# Patient Record
Sex: FEMALE
[De-identification: names, ages, dates, MRNs, and addresses within clinical notes are randomized per-mention and may not be internally consistent; named-entity substitution may affect disease eponyms.]

---

## 2017-07-22 ENCOUNTER — HEALTH MAINTENANCE LETTER (OUTPATIENT)
Age: 62
End: 2017-07-22

## 2017-08-24 ENCOUNTER — OFFICE VISIT (OUTPATIENT)
Dept: AUDIOLOGY | Facility: CLINIC | Age: 62
End: 2017-08-24

## 2017-08-24 DIAGNOSIS — H90.A31 MIXED CONDUCTIVE AND SENSORINEURAL HEARING LOSS OF RIGHT EAR WITH RESTRICTED HEARING OF LEFT EAR: ICD-10-CM

## 2017-08-24 DIAGNOSIS — H90.A22 SENSORINEURAL HEARING LOSS (SNHL) OF LEFT EAR WITH RESTRICTED HEARING OF RIGHT EAR: ICD-10-CM

## 2017-08-24 NOTE — PROGRESS NOTES
AUDIOLOGY REPORT    SUBJECTIVE:  Rocio Forbes is a 62 year old female who was seen in Audiology at the [facility] [place] for a cochlear implant evaluation.  The patient has been seen at an outside clinic for a hearing test on 1/23/15 and results indicated mild sensorineural hearing loss in the left ear and a severe rising to moderately-severe mixed hearing loss in the right ear.  The patient reports hearing loss in the right ear present since age 13 and 59 years old in the left ear. She reports a history of multiple right middle ear surgeries and right tinnitus. She utilizes binaural amplification. She denies dizziness.    OBJECTIVE:    Otoscopic exam indicates ears are clear of cerumen bilaterally     Pure Tone Thresholds assessed using conventional audiometry with good  reliability from 250-8000 Hz bilaterally using insert earphones and circumaural headphones     RIGHT:  Severe rising to moderately-severe mixed hearing loss    LEFT:    Mild sensorineural hearing loss    Tympanogram:    RIGHT: negative pressure     LEFT:   hypermobile    Reflexes (reported by stimulus ear):  RIGHT: Ipsilateral is absent at frequencies tested  RIGHT: Contralateral is absent at frequencies tested  LEFT:   Ipsilateral is present at normal levels  LEFT:   Contralateral is absent at frequencies tested      Speech Reception Threshold:    RIGHT: 70 dB HL    LEFT:   30 dB HL  Word Recognition Score:     RIGHT: 84% at 100 dB HL using NU-6 recorded word list.    LEFT:   88% at 70 dB HL using NU-6 recorded word list.    Due to results of the audiogram the cochlear implant evaluation was not completed. We reviewed the results and I explained that she would likely not be a candidate for a cochlear implant but would be a good candidate for a bone-anchored hearing aid. We reviewed how Baha's are different than hearing aids and cochlear implants and she was provided a demo with both manufacturers. She is  interested in moving forward with this option and will check with her insurance in the mean time.    ASSESSMENT:     Compared to patient's previous audiogram dated 1/23/15, hearing has remained stable. Cochlear implant evaluation not completed. Today s results were discussed with the patient in detail.     PLAN:  Patient was counseled regarding bone-anchored hearing aids. It is recommended that the patient return for a device selection and for surgical consultation with Dr. Rasheed.  Please call this clinic with questions regarding these results or recommendations.    Kellen Alvarez  Licensed Audiologist  [place] License #[id number]

## 2017-08-24 NOTE — MR AVS SNAPSHOT
After Visit Summary   8/24/2017    Rocio Forbes    MRN: [id number]           Patient Information     Date Of Birth          1955        Visit Information        Provider Department      8/24/2017 1:00 PM Char Yao AuD M [facility] Audiology        Today's Diagnoses     Mixed conductive and sensorineural hearing loss of right ear with restricted hearing of left ear        Sensorineural hearing loss (SNHL) of left ear with restricted hearing of right ear           Follow-ups after your visit        Your next 10 appointments already scheduled     Oct 05, 2017 11:30 AM CDT   (Arrive by 11:15 AM)   New Patient Visit with MD MARY LOU Drew [facility] Ear Nose and Throat ([facility])    [address] [place] [id number] [phone]            Oct 05, 2017  1:00 PM CDT   Bone [place] Consultation with Natali Trinidad [facility] Audiology ([facility])    [address] [place] [id number] [phone]              Who to contact     Please call your clinic at [phone] to:    Ask questions about your health    Make or cancel appointments    Discuss your medicines    Learn about your test results    Speak to your doctor   If you have compliments or concerns about an experience at your clinic, or if you wish to file a complaint, please contact [facility] Patient Relations at [phone] or email us at [email or web address]         Additional Information About Your Visit        daysofthart Information     [employer] gives you secure access to your electronic health record. If you see a primary care provider, you can also send messages to your care team and make appointments. If you have questions, please call your primary care clinic.  If you do not have a primary care provider, please call [phone] and they will assist you.      [employer] is an electronic gateway  that provides easy, online access to your medical records. With [employer], you can request a clinic appointment, read your test results, renew a prescription or communicate with your care team.     To access your existing account, please contact your [facility] or call [phone] for assistance.        Care EveryWhere ID     This is your Care EveryWhere ID. This could be used by other organizations to access your [place] medical records  [id number]         Blood Pressure from Last 3 Encounters:   No data found for BP    Weight from Last 3 Encounters:   No data found for Wt              We Performed the Following     AUDIOGRAM/TYMPANOGRAM - INTERFACE     Cmpn Audiometry Thrshld Eval & Speech Recog (13679)     Tymps / Reflex   (01718)        Primary Care Provider    None Frw       No address on file        Equal Access to Services     CHUCK SOL : Hadii aad ku hadasho Soomaali, waaxda luqadaha, qaybta kaalmada adeegyada, waxay idiin hayaan dexter raymond . So [facility] [phone].    ATENCIÓN: Si habla español, tiene a reynoso disposición servicios gratuitos de asistencia lingüística. Llame al [phone].    We comply with applicable federal civil rights laws and [place] laws. We do not discriminate on the basis of race, color, national origin, age, disability sex, sexual orientation or gender identity.            Thank you!     Thank you for choosing [facility] AUDIOLOGY  for your care. Our goal is always to provide you with excellent care. Hearing back from our patients is one way we can continue to improve our services. Please take a few minutes to complete the written survey that you may receive in the mail after your visit with us. Thank you!             Your Updated Medication List - Protect others around you: Learn how to safely use, store and throw away your medicines at www.disposemymeds.org.      Notice  As of 8/24/2017  3:07 PM    You have not been prescribed any  medications.

## 2017-11-03 NOTE — TELEPHONE ENCOUNTER
APPT INFO    Date /Time: 11/13/17 AT 8:30 AM   Reason for Appt: BAHA   Ref Provider/Clinic: [facility] AUDIOLOGY   Are there internal records? If yes, list: YES;  [facility] AUDIOLOGY   Patient Contact (Y/N) & Call Details: YES   Action: [place] ,MEDICAL RECORDS REQUESTED     OUTSIDE RECORDS CHECKLIST     CLINIC NAME COMMENTS REC (x) IMG (x)   [facility]  2015 SCANNED IN  X NONE   [facility] NONE

## 2017-11-08 NOTE — TELEPHONE ENCOUNTER
Phone Call:    Who did you talk to? (or) Who did you call? Madeleine ([facility])   Call Detail/Action: Called to check on records, says they received it yesterday but was blurry. Re-faxed request to [facility].

## 2017-11-09 NOTE — TELEPHONE ENCOUNTER
Phone Call:    Who did you talk to? (or) Who did you call? [facility] will only release 1 year of records. Called pt.   Call Detail/Action: LVM to call me back

## 2017-11-09 NOTE — TELEPHONE ENCOUNTER
Records Received From:  [facility]    DATE/EXAM/LOCATION  (specify location if different)   Office Notes: 11/28/07, 9/24/07 11/9/07 Audiology   Labs: 9/24/07   Operative Notes: 11/27/07 Right tympanotomy w/ ossicular reconstruction    Missing: Hx, Right Mastoidectomy (unsure when/where) waiting for pt to call me back   '

## 2017-11-09 NOTE — TELEPHONE ENCOUNTER
Phone Call:    Who did you talk to? (or) Who did you call? Patient called & left vm,    Call Detail/Action: Per pt, [facility]ide[facility] was [facility] in [facility] at [facility] . Pt states that she's tried to get a copy of this op note before in the past and [facility] no longer has it (has been purged). Only other surgery was with Dr. Flor at [facility] which was already received. Pt states that the records with [facility] is probably all that we'll get.

## 2017-11-13 ENCOUNTER — OFFICE VISIT (OUTPATIENT)
Dept: AUDIOLOGY | Facility: CLINIC | Age: 62
End: 2017-11-13

## 2017-11-13 ENCOUNTER — OFFICE VISIT (OUTPATIENT)
Dept: OTOLARYNGOLOGY | Facility: CLINIC | Age: 62
End: 2017-11-13

## 2017-11-13 ENCOUNTER — PRE VISIT (OUTPATIENT)
Dept: OTOLARYNGOLOGY | Facility: CLINIC | Age: 62
End: 2017-11-13

## 2017-11-13 VITALS — WEIGHT: 155 LBS | BODY MASS INDEX: 32.54 KG/M2 | HEIGHT: 58 IN

## 2017-11-13 DIAGNOSIS — H91.90 HEARING LOSS: Primary | ICD-10-CM

## 2017-11-13 DIAGNOSIS — H90.A22 SENSORINEURAL HEARING LOSS (SNHL) OF LEFT EAR WITH RESTRICTED HEARING OF RIGHT EAR: ICD-10-CM

## 2017-11-13 DIAGNOSIS — H92.11 OTORRHEA, RIGHT: Primary | ICD-10-CM

## 2017-11-13 DIAGNOSIS — H90.A31 MIXED CONDUCTIVE AND SENSORINEURAL HEARING LOSS OF RIGHT EAR WITH RESTRICTED HEARING OF LEFT EAR: Primary | ICD-10-CM

## 2017-11-13 PROBLEM — H90.A11 CONDUCTIVE HEARING LOSS OF RIGHT EAR WITH RESTRICTED HEARING OF LEFT EAR: Status: ACTIVE | Noted: 2017-10-25

## 2017-11-13 RX ORDER — CITALOPRAM HYDROBROMIDE 10 MG/1
20 TABLET ORAL DAILY
COMMUNITY
Start: 2017-10-25 | End: 2020-03-03

## 2017-11-13 RX ORDER — OFLOXACIN 3 MG/ML
5 SOLUTION AURICULAR (OTIC) 2 TIMES DAILY
Qty: 5 ML | Refills: 0 | Status: SHIPPED | OUTPATIENT
Start: 2017-11-13 | End: 2017-11-23

## 2017-11-13 ASSESSMENT — PAIN SCALES - GENERAL: PAINLEVEL: NO PAIN (0)

## 2017-11-13 NOTE — LETTER
11/13/2017       RE: Rocio Forbes  [address] [address] [address]     Dear Colleague,    Thank you for referring your patient, Rocio Forbes, to the [facility] EAR NOSE AND THROAT at [facility]. Please see a copy of my visit note below.    Neurotology Clinic Note- New Visit     Rocio Forbes is a 62 year old female being seen for BAHA evaluation.     HPI: Ms. Forbes is a pleasant 61 yo lady with extensive right ear history including   CWD tympanomastoidectomy for cholesteatoma resection in 1973 and an OCR in 2017. Her last mastoid bowl cleaning was done 2 years ago. She denies any recurrent ear infections, otorrhea, otalgia, vertigo, imbalance. She denies any issues with her left hear. She uses bilateral hearing aids.     She was evaluated by our Audiology colleagues for a osteointegrated bone anchored implant and she was very pleased with the results at the time. She does not remember the device she had tried/chosen.      PMH: Migraine headaches, depression, nasal allergies     PSH:   Denies issues with anesthesia, bleeding, clotting.   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      Medications: citalopram, Flonase     Allergies: NKDA     FH:  Grandparents- presbyacusis, mother- HTN, grandparents- stroke, father- DM     SH: never smoker, does not drink alcohol, She is  lives with spouse. 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         Patient Supplied Answers to Review of Systems  [facility] ROS 10/30/2017   Ears, Nose, Throat Hearing loss      The remainder of the 10 point review of systems is otherwise negative.     Physical  examination:  Constitutional:  In no acute distress, appears stated age  Eyes:  Extraocular movements intact, no spontaneous nystagmus  Ears:  Both ears examined under the microscope.   The left ear EAC has cerumen impaction removed with right angle pick and alligator forceps. The right ear mastoid bowl has cerumen impacted which was partially removed using a right angle pick, alligator forceps, and suction. There was some purulent discharge in between the cerumen/epithelial debris and skin. EAC skin erythematous in some areas.   Respiratory:  No increased work of breathing, wheezing or stridor  Musculoskeletal:  Good upper extremity strength  Skin:  No rashes on the head and neck  Neurologic:  House Brackman 1/6 bilaterally, ambulating normally  Psychiatric:  Alert, normal affect, answering questions appropriately     Audiogram:       Today's audio 11/13/17: left stable mild to moderate SNHL with ABG at 250-1000 kHz. Right moderately-severe to profound MHL. WRS  88% in the right, 80% in the left. Right type B tympanogram with large canal volume 2.64; Left type Ad tymp with normal volume.      Previous records audiogram results:  2015: right moderate to severe asymmetrical  MHL and mild SNHL in the left. WRS 96% in the left and 92% in the right. Type Ad tymp in the left and large volume type B in the right.      Assessment and plan:   Ms. Forbes is a pleasant 63 yo lady who comes to our clinic for a bone anchored osteointegrated implant evaluation for her right ear for right mixed hearing loss. She has a history of CWD tympanomastoidectomy and OCR in her right ear. She was evaluated in our Audiology Clinic and she was pleased with the trial results. Today she had some impacted cerumen firmly adhered to her mastoid bowl skin with some underlying purulent discharge. A culture swab was sent for analysis and ear drops prescribed to soften the debris for follow up debridement. She will follow up with us for a mastoid  bowl debridement. We will also have her follow up with our Audiology colleagues for a device selection. We will also start the authorization process with her insurance. She will need a preop evaluation if she would like to undergo the right ear bone anchored osteointegrated device implant procedure.     Questions and concerns were addressed to the patient's satisfaction.    Patient was discussed and seen with Dr. Chong.     Terri Reis MD  PGY-2 [place]    Physician Attestation   I agree with the information in this note.    Marcio Chong      Again, thank you for allowing me to participate in the care of your patient.      Sincerely,    Marcio Chong MD

## 2017-11-13 NOTE — NURSING NOTE
Chief Complaint   Patient presents with     Consult     Discuss EDWARD Argueta Medical Assistant

## 2017-11-13 NOTE — PROGRESS NOTES
AUDIOLOGY REPORT    SUMMARY: Audiology visit completed. See audiogram for results.      RECOMMENDATIONS: Follow-up with ENT.    Kellen Coreas.  Licensed Audiologist  [place] #[id number]

## 2017-11-13 NOTE — MR AVS SNAPSHOT
After Visit Summary   11/13/2017    Rocio Forbes    MRN: [id number]           Patient Information     Date Of Birth          1955        Visit Information        Provider Department      11/13/2017 8:30 AM Marcio Chong MD [facility] Ear Nose and Throat        Today's Diagnoses     Otorrhea, right    -  1      Care Instructions     You will be called with the results of the ear culture.  Please  the prescription for ear drops at the pharmacy.  You will have a follow up appointment with Dr. Rasheed - for ear- mastoid bowl cleaning.  You will have a follow up appointment with audiology for device selection of BAHA.  We will start the authorization process for checking if your insurance   Patient to schedule a pre-op physical with their primary MD or with our Preoperative Assessment Clinic within 30 days of the procedure.    Patient to avoid blood thinning medications 1 week prior to surgery (Ibuprofen, Aleve, Aspirin, fish oil )   Use the antiseptic scrub as directed.   You will need to schedule a post operative appointment for 3 weeks after surgery    Patient to call the ENT clinic with further questions or concerns:   ENT Triage Nurse  [phone] option 3  ENT appointment scheduling [phone] option 1  ENT surgery scheduling, Maria Ines [phone]  ENT Nurse Tracie [phone]          Follow-ups after your visit        Your next 10 appointments already scheduled     Jan 08, 2018  8:30 AM CST   Bone [place] Consultation with Natali Garcia   [facility] Audiology ([facility])    [address] [place] [address] [phone]            Jan 08, 2018 10:00 AM CST   (Arrive by 9:45 AM)   RETURN NEUROTOLOGY with Marcia Rasheed MD   [facility] Ear Nose and Throat ([facility])    [address] [place] [address] [phone]              Who to contact     Please call your clinic at  "[phone] to:    Ask questions about your health    Make or cancel appointments    Discuss your medicines    Learn about your test results    Speak to your doctor   If you have compliments or concerns about an experience at your clinic, or if you wish to file a complaint, please contact [facility] Patient Relations at [phone] or email us at [email or web address]         Additional Information About Your Visit        Singular[employer]Mailana Information     [employer] gives you secure access to your electronic health record. If you see a primary care provider, you can also send messages to your care team and make appointments. If you have questions, please call your primary care clinic.  If you do not have a primary care provider, please call [phone] and they will assist you.      [employer] is an electronic gateway that provides easy, online access to your medical records. With [employer], you can request a clinic appointment, read your test results, renew a prescription or communicate with your care team.     To access your existing account, please contact your [facility] or call [phone] for assistance.        Care EveryWhere ID     This is your Care EveryWhere ID. This could be used by other organizations to access your [place] medical records  [id number]        Your Vitals Were     Height BMI (Body Mass Index)                1.473 m (4' 10\") 32.4 kg/m2           Blood Pressure from Last 3 Encounters:   No data found for BP    Weight from Last 3 Encounters:   11/13/17 70.3 kg (155 lb)              Today, you had the following     No orders found for display         Today's Medication Changes          These changes are accurate as of: 11/13/17 10:21 AM.  If you have any questions, ask your nurse or doctor.               Start taking these medicines.        Dose/Directions    ofloxacin 0.3 % otic solution   Commonly known as:  FLOXIN   Used for:  " Otorrhea, right   Started by:  Marcio Chong MD        Dose:  5 drop   Place 5 drops into the right ear 2 times daily for 10 days   Quantity:  5 mL   Refills:  0            Where to get your medicines      [facility] [place] [place] - [address]  [address], [place] [address]     Phone:  [phone]     ofloxacin 0.3 % otic solution                Primary Care Provider    None Specified       No primary provider on file.        Equal Access to Services     [facility]: Hadii aad ku hadasho Soomaali, waaxda luqadaha, qaybta kaalmada adeegyada, waxay idiin haymollyn dexter raymond . So [facility] [phone].    ATENCIÓN: Si habla español, tiene a reynoso disposición servicios gratuitos de asistencia lingüística. Llame al [phone].    We comply with applicable federal civil rights laws and [place] laws. We do not discriminate on the basis of race, color, national origin, age, disability, sex, sexual orientation, or gender identity.            Thank you!     Thank you for choosing [facility] EAR NOSE AND THROAT  for your care. Our goal is always to provide you with excellent care. Hearing back from our patients is one way we can continue to improve our services. Please take a few minutes to complete the written survey that you may receive in the mail after your visit with us. Thank you!             Your Updated Medication List - Protect others around you: Learn how to safely use, store and throw away your medicines at www.disposemymeds.org.          This list is accurate as of: 11/13/17 10:21 AM.  Always use your most recent med list.                   Brand Name Dispense Instructions for use Diagnosis    citalopram 10 MG tablet    celeXA     20 mg daily        ofloxacin 0.3 % otic solution    FLOXIN    5 mL    Place 5 drops into the right ear 2 times daily for 10 days    Otorrhea, right

## 2017-11-13 NOTE — LETTER
Date:November 14, 2017      Patient was self referred, no letter generated. Do not send.        [facility] Health Information

## 2017-11-13 NOTE — PATIENT INSTRUCTIONS
You will be called with the results of the ear culture.  Please  the prescription for ear drops at the pharmacy.  You will have a follow up appointment with Dr. Rasheed - for ear- mastoid bowl cleaning.  You will have a follow up appointment with audiology for device selection of BAHA.  We will start the authorization process for checking if your insurance   Patient to schedule a pre-op physical with their primary MD or with our Preoperative Assessment Clinic within 30 days of the procedure.    Patient to avoid blood thinning medications 1 week prior to surgery (Ibuprofen, Aleve, Aspirin, fish oil )   Use the antiseptic scrub as directed.   You will need to schedule a post operative appointment for 3 weeks after surgery    Patient to call the ENT clinic with further questions or concerns:   ENT Triage Nurse  [phone] option 3  ENT appointment scheduling [phone] option 1  ENT surgery scheduling, Maria Ines [phone]  ENT Nurse Tracie [phone]

## 2017-11-13 NOTE — PROGRESS NOTES
Neurotology Clinic Note- New Visit     Rocio Forbes is a 62 year old female being seen for BAHA evaluation.     HPI: Ms. Forbes is a pleasant 63 yo lady with extensive right ear history including  CWD tympanomastoidectomy for cholesteatoma resection in 1973 and an OCR in 2017. Her last mastoid bowl cleaning was done 2 years ago. She denies any recurrent ear infections, otorrhea, otalgia, vertigo, imbalance. She denies any issues with her left hear. She uses bilateral hearing aids.     She was evaluated by our Audiology colleagues for a osteointegrated bone anchored implant and she was very pleased with the results at the time. She does not remember the device she had tried/chosen.      PMH: Migraine headaches, depression, nasal allergies     PSH:   Denies issues with anesthesia, bleeding, clotting.   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      Medications: citalopram, Flonase     Allergies: NKDA     FH:  Grandparents- presbyacusis, mother- HTN, grandparents- stroke, father- DM     SH: never smoker, does not drink alcohol, She is  lives with spouse. 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         Patient Supplied Answers to Review of Systems   ENT ROS 10/30/2017   Ears, Nose, Throat Hearing loss      The remainder of the 10 point review of systems is otherwise negative.     Physical examination:  Constitutional:  In no acute distress, appears stated age  Eyes:  Extraocular movements intact, no spontaneous nystagmus  Ears:  Both ears examined under the microscope.  The left ear EAC has cerumen impaction removed with right angle pick and alligator forceps. The right  ear mastoid bowl has cerumen impacted which was partially removed using a right angle pick, alligator forceps, and suction. There was some purulent discharge in between the cerumen/epithelial debris and skin. EAC skin erythematous in some areas.   Respiratory:  No increased work of breathing, wheezing or stridor  Musculoskeletal:  Good upper extremity strength  Skin:  No rashes on the head and neck  Neurologic:  House Brackman 1/6 bilaterally, ambulating normally  Psychiatric:  Alert, normal affect, answering questions appropriately     Audiogram:       Today's audio 11/13/17: left stable mild to moderate SNHL with ABG at 250-1000 kHz. Right moderately-severe to profound MHL. WRS  88% in the right, 80% in the left. Right type B tympanogram with large canal volume 2.64; Left type Ad tymp with normal volume.      Previous records audiogram results:  2015: right moderate to severe asymmetrical  MHL and mild SNHL in the left. WRS 96% in the left and 92% in the right. Type Ad tymp in the left and large volume type B in the right.      Assessment and plan:  Ms. Forbes is a pleasant 63 yo lady who comes to our clinic for a bone anchored osteointegrated implant evaluation for her right ear for right mixed hearing loss. She has a history of CWD tympanomastoidectomy and OCR in her right ear. She was evaluated in our Audiology Clinic and she was pleased with the trial results. Today she had some impacted cerumen firmly adhered to her mastoid bowl skin with some underlying purulent discharge. A culture swab was sent for analysis and ear drops prescribed to soften the debris for follow up debridement. She will follow up with us for a mastoid bowl debridement. We will also have her follow up with our Audiology colleagues for a device selection. We will also start the authorization process with her insurance. She will need a preop evaluation if she would like to undergo the right ear bone anchored osteointegrated device implant  procedure.     Questions and concerns were addressed to the patient's satisfaction.    Patient was discussed and seen with Dr. Chong.     Terri Reis MD  PGY-2 [place]NS    Physician Attestation     I, Marcio Chong, saw this patient withy the resident and agree with the resident's findings and plan of care as documented in the resident's note.    Marcio Chong

## 2017-11-13 NOTE — MR AVS SNAPSHOT
After Visit Summary   11/13/2017    Rocio Forbes    MRN: [id number]           Patient Information     Date Of Birth          1955        Visit Information        Provider Department      11/13/2017 7:30 AM Corinna Chin AuD M [facility] Audiology        Today's Diagnoses     Mixed conductive and sensorineural hearing loss of right ear with restricted hearing of left ear    -  1    Sensorineural hearing loss (SNHL) of left ear with restricted hearing of right ear           Follow-ups after your visit        Your next 10 appointments already scheduled     Jan 08, 2018  8:30 AM CST   Bone [place] Consultation with Natali Garcia [facility] ([facility])    [address] [place] [id number] [phone]            Jan 08, 2018 10:00 AM CST   (Arrive by 9:45 AM)   RETURN NEUROTOLOGY with Marcia Rasheed MD   [facility] Ear Nose and Throat ([facility])    [address] [place] [address] [phone]              Who to contact     Please call your clinic at [phone] to:    Ask questions about your health    Make or cancel appointments    Discuss your medicines    Learn about your test results    Speak to your doctor   If you have compliments or concerns about an experience at your clinic, or if you wish to file a complaint, please contact [facility] Patient Relations at [phone] or email us at [email or web address]         Additional Information About Your Visit        Brunohart Information     OctaneNationt gives you secure access to your electronic health record. If you see a primary care provider, you can also send messages to your care team and make appointments. If you have questions, please call your primary care clinic.  If you do not have a primary care provider, please call [phone] and they will assist you.      Ted is an  electronic gateway that provides easy, online access to your medical records. With VidaPak, you can request a clinic appointment, read your test results, renew a prescription or communicate with your care team.     To access your existing account, please contact your [facility] or call [phone] for assistance.        Care EveryWhere ID     This is your Care EveryWhere ID. This could be used by other organizations to access your [place] medical records  [id number]         Blood Pressure from Last 3 Encounters:   No data found for BP    Weight from Last 3 Encounters:   11/13/17 70.3 kg (155 lb)              We Performed the Following     AUDIOGRAM/TYMPANOGRAM - INTERFACE     [facility] Audiometry Thrshld Eval & Speech Recog (17381)     Tymps / Reflex   (23868)          Today's Medication Changes          These changes are accurate as of: 11/13/17  4:59 PM.  If you have any questions, ask your nurse or doctor.               Start taking these medicines.        Dose/Directions    ofloxacin 0.3 % otic solution   Commonly known as:  FLOXIN   Used for:  Otorrhea, right   Started by:  Marcio Chong MD        Dose:  5 drop   Place 5 drops into the right ear 2 times daily for 10 days   Quantity:  5 mL   Refills:  0            Where to get your medicines      [facility] [address]  [address] [facility] [address]     Phone:  [phone]     ofloxacin 0.3 % otic solution                Primary Care Provider    None Specified       No primary provider on file.        Equal Access to Services     CHUCK SOL : Hadii charles Nur, waaxda luqadaha, qaybta kaalmada tata corcoran. So [facility] [phone].    ATENCIÓN: Si habla español, tiene a reynoso disposición servicios gratuitos de asistencia lingüística. Llame al [phone].    We comply with applicable federal civil  rights laws and [place] laws. We do not discriminate on the basis of race, color, national origin, age, disability, sex, sexual orientation, or gender identity.            Thank you!     Thank you for choosing [facility] AUDIOLOGY  for your care. Our goal is always to provide you with excellent care. Hearing back from our patients is one way we can continue to improve our services. Please take a few minutes to complete the written survey that you may receive in the mail after your visit with us. Thank you!             Your Updated Medication List - Protect others around you: Learn how to safely use, store and throw away your medicines at www.disposemymeds.org.          This list is accurate as of: 11/13/17  4:59 PM.  Always use your most recent med list.                   Brand Name Dispense Instructions for use Diagnosis    citalopram 10 MG tablet    celeXA     20 mg daily        ofloxacin 0.3 % otic solution    FLOXIN    5 mL    Place 5 drops into the right ear 2 times daily for 10 days    Otorrhea, right

## 2017-11-13 NOTE — NURSING NOTE
Teaching Flowsheet - ENT   Relevant Diagnosis: hearing loss  Teaching Topic-- right BAHA  Pt needs to see audiology for BAHA device selection.  Pt needs to see Dr. Rasheed for surgical discussion - f/u on right ear drainage, mastoid bowl cleaning.  Person(s) involved in teaching:  Patient     Motivation Level:  Asks Questions:   Yes  Eager to Learn:   Yes  Cooperative:   Yes  Receptive (willing/able to accept information):   Yes  Comments: Reviewed pre-op H and P,  NPO prior to  surgery,  pre-op scrub (given Hibiclens)  Reviewed post-op  cares including  wound care, activity and pain.     Patient demonstrates understanding of the following:  Reason for the appointment, diagnosis and treatment plan:   Yes  Knowledge of proper use of medications and conditions for which they are ordered (with special attention to potential side effects or drug interactions):  stop aspirin products 1 week before surgery Yes  Which situations necessitate calling provider and whom to contact:   Yes  Nutritional needs and diet plan:   Yes  Pain management techniques:   Yes  Patient instructed on hand hygiene:  Yes  How and/when to access community resources:   Yes     Infection Prevention:  Patient   demonstrates understanding of the following:  Surgical procedure site care taught Yes  Signs and symptoms of infection taught Yes  Wound care taught Yes  Instructional Materials Used/Given: pre- op booklet, ear surgery  handout and verbal  Instruction.  Pt will be called with the results of the ear culture. Will start the process for checking on  insurance coverage.

## 2017-11-15 LAB
BACTERIA SPEC CULT: NO GROWTH
SPECIMEN SOURCE: NORMAL

## 2017-11-17 ENCOUNTER — CARE COORDINATION (OUTPATIENT)
Dept: OTOLARYNGOLOGY | Facility: CLINIC | Age: 62
End: 2017-11-17

## 2017-11-17 NOTE — PROGRESS NOTES
Ear Culture done 11-13-17 bacterial no growth, fungal pending - negative so far will call if this changes.  Above messages left on pt voice mail.  Tracie Riojas RN  ENT Care Coordinator   Otology  [phone]  11/17/2017 9:08 AM  12-14-17 fungal culture negative.  Tracie Riojas RN  ENT Care Coordinator   Otology  [phone]  12/14/2017 8:54 AM

## 2017-11-29 ENCOUNTER — TELEPHONE (OUTPATIENT)
Dept: OTOLARYNGOLOGY | Facility: CLINIC | Age: 62
End: 2017-11-29

## 2017-11-29 NOTE — TELEPHONE ENCOUNTER
1/24/17  Left message to call [facility] surgery scheduling for Dr Rasheed    11/29/17  Lt 2nd msg for return call    Patient has clinic appointment for BAHA consult and seeing Dr Rasheed.    Yanni Esparza   ENT [place]-Op Coordinator  [phone]

## 2017-12-11 LAB
FUNGUS SPEC CULT: NORMAL
SPECIMEN SOURCE: NORMAL

## 2018-01-08 ENCOUNTER — OFFICE VISIT (OUTPATIENT)
Dept: AUDIOLOGY | Facility: CLINIC | Age: 63
End: 2018-01-08
Payer: COMMERCIAL

## 2018-01-08 ENCOUNTER — OFFICE VISIT (OUTPATIENT)
Dept: OTOLARYNGOLOGY | Facility: CLINIC | Age: 63
End: 2018-01-08
Payer: COMMERCIAL

## 2018-01-08 VITALS — WEIGHT: 154 LBS | BODY MASS INDEX: 32.32 KG/M2 | HEIGHT: 58 IN

## 2018-01-08 DIAGNOSIS — H90.A22 SENSORINEURAL HEARING LOSS (SNHL) OF LEFT EAR WITH RESTRICTED HEARING OF RIGHT EAR: ICD-10-CM

## 2018-01-08 DIAGNOSIS — H90.A31 MIXED CONDUCTIVE AND SENSORINEURAL HEARING LOSS OF RIGHT EAR WITH RESTRICTED HEARING OF LEFT EAR: ICD-10-CM

## 2018-01-08 DIAGNOSIS — H90.A31 MIXED CONDUCTIVE AND SENSORINEURAL HEARING LOSS OF RIGHT EAR WITH RESTRICTED HEARING OF LEFT EAR: Primary | ICD-10-CM

## 2018-01-08 ASSESSMENT — PAIN SCALES - GENERAL: PAINLEVEL: NO PAIN (0)

## 2018-01-08 NOTE — PROGRESS NOTES
AUDIOLOGY REPORT    BACKGROUND INFORMATION: Rocio Forbes, 62 year old female, was seen in Audiology at the [facility] on 1/8/2018 for an osseointegrated hearing implant consultation. The patient has been seen previously in this clinic on 11/13/2017 for a hearing evaluation and results indicated left normal to mild sensorineural hearing loss, and right asymmetric moderately-severe to profound mixed hearing loss.   The patient was medically evaluated by Dr. Marcia Rasheed and determined to be cleared for an osseointegrated device, and so returns for a consultation today to be counseled on options for osseointegrated devices to help aid in communication.     TEST RESULTS AND PROCEDURES: A consultation was conducted in which the patient was presented with options from both Cochlear and [employer]. The appointment was spent outlining and comparing the options available from both companies.  The patient was provided a trial of both Cochlear BAHA 5 power and [employer] Ponto 3 SP osseointegrated devices in office. The trial was accomplished by connecting the outer processor to a headband.  When the device is placed on the mastoid bone (located behind the ear) of the poorer ear right ear, bone vibration is used to stimulate the better cochlea. The patient reported better sound quality with the [employer] device. She is interested in moving forward with a right [employer] Ponto 3 SP osseointegrated device in the color mocca brown, with a black streamer.     SUMMARY AND RECOMMENDATIONS: An osseointegrated hearing implant consultation was conducted today as the patient has been medically cleared for surgery and use with this device.  Options were presented from [employer] and Cochlear and the patient is interested in moving forward with a device from [employer].  Follow-up with Dr. Marcia Rasheed for medical management and return to this clinic for audiologic  care.    Kellen Coreas.  Licensed Audiologist  [place] #[id number]

## 2018-01-08 NOTE — MR AVS SNAPSHOT
After Visit Summary   1/8/2018    Rocio Forbes    MRN: [id number]           Patient Information     Date Of Birth          1955        Visit Information        Provider Department      1/8/2018 10:00 AM Marcia Rasheed MD [facility] Ear Nose and Throat        Today's Diagnoses     Mixed conductive and sensorineural hearing loss of right ear with restricted hearing of left ear    -  1      Care Instructions     Patient to review pre operative information.   Patient to schedule a pre-op physical with their primary MD or with our Preoperative Assessment Clinic within 30 days of the procedure.    Patient to avoid blood thinning medications 1 week prior to surgery (Ibuprofen, Aleve, Aspirin, fish oil )   Use the antiseptic scrub as directed.   You will need to schedule a post operative appointment for 3 weeks after surgery    Patient to call the ENT clinic with further questions or concerns:   ENT Triage Nurse  [phone] option 3  ENT appointment scheduling [phone] option 1  ENT surgery scheduling, Maria Ines [phone]  ENT Nurse Tracie [phone]          Follow-ups after your visit        Your next 10 appointments already scheduled     Apr 09, 2018  8:30 AM CDT   (Arrive by 8:15 AM)   PAC PHONE RN ASSESSMENT with  Pac Rn   [facility] Preoperative Assessment Center ([facility])    [address] [place] [address]  [address] [facility] [address] [phone]           Note: this is not an onsite visit; there is no need to come to the facility.            Apr 11, 2018   Procedure with Marcia Rasheed MD   [facility] Surgery and Procedure Center ([facility])    [address] [place] [address]  [address] [facility] [id number] [phone]           Located in the Clinics and Surgery Center at [address] [place] [address].   parking is very convenient and highly recommended.  is a $6 flat rate fee.  Both  and self parkers should enter  "the main arrival plaza from [facility]; parking attendants will direct you based on your parking preference.            Apr 18, 2018  8:30 AM CDT   (Arrive by 8:15 AM)   Return Visit with Marcia Rasheed MD   [facility] Ear Nose and Throat ([facility])    [address] [facility]  [address] [facility] [address] [phone]              Who to contact     Please call your clinic at [phone] to:    Ask questions about your health    Make or cancel appointments    Discuss your medicines    Learn about your test results    Speak to your doctor   If you have compliments or concerns about an experience at your clinic, or if you wish to file a complaint, please contact [facility] Patient Relations at [phone] or email us at [email or web address]         Additional Information About Your Visit        Cardioroboticshart Information     Authentic8t gives you secure access to your electronic health record. If you see a primary care provider, you can also send messages to your care team and make appointments. If you have questions, please call your primary care clinic.  If you do not have a primary care provider, please call [phone] and they will assist you.      Enthuse is an electronic gateway that provides easy, online access to your medical records. With Enthuse, you can request a clinic appointment, read your test results, renew a prescription or communicate with your care team.     To access your existing account, please contact your [facility] Clinic or call [phone] for assistance.        Care EveryWhere ID     This is your Care EveryWhere ID. This could be used by other organizations to access your [place] medical records  [id number]        Your Vitals Were     Height BMI (Body Mass Index)                1.473 m (4' 9.99\") 32.19 kg/m2           Blood Pressure from Last 3 Encounters:   No data found for BP    Weight from Last " 3 Encounters:   01/08/18 69.9 kg (154 lb)   11/13/17 70.3 kg (155 lb)              We Performed the Following     BINOCULAR MICROSCOPY     DEBRIDMENT MASTOID CAVITY, SIMPLE        Primary Care Provider Office Phone # Fax #    Tre Mace [phone]       [facility] [address]      [facility] [address]        Equal Access to Services     CHUCK SOL : Hadii aad ku hadasho Soomaali, waaxda luqadaha, qaybta kaalmada adeegyada, waxay idiin hayaan adeeg kharash la'aan ah. So [facility] [phone].    ATENCIÓN: Si habla espmargie, tiene a renyoso disposición servicios gratuitos de asistencia lingüística. Llame al [phone].    We comply with applicable federal civil rights laws and [place] laws. We do not discriminate on the basis of race, color, national origin, age, disability, sex, sexual orientation, or gender identity.            Thank you!     Thank you for choosing [facility] EAR NOSE AND THROAT  for your care. Our goal is always to provide you with excellent care. Hearing back from our patients is one way we can continue to improve our services. Please take a few minutes to complete the written survey that you may receive in the mail after your visit with us. Thank you!             Your Updated Medication List - Protect others around you: Learn how to safely use, store and throw away your medicines at www.disposemymeds.org.          This list is accurate as of: 1/8/18 11:59 PM.  Always use your most recent med list.                   Brand Name Dispense Instructions for use Diagnosis    citalopram 10 MG tablet    celeXA     20 mg daily        estradiol biweekly    VIVELLE-DOT

## 2018-01-08 NOTE — PROGRESS NOTES
ENT Clinic Note  1/8/2018    Rocio Forbes is a 62 year old year old female who is seen in consultation from Dr. Cai regarding an osseointegrated implant with sound processor.  She has a mixed loss in the in the right ear and a sensorineural loss in the in the left ear.  A hearing aid was tried unsuccessfully.  There is no otalgia or otorrhea.  There is no vertigo/disequilibrium.    Past Medical History:   Diagnosis Date     Depressive disorder 2004     Hearing problem 1969     Recurrent otitis media 1969     Past Surgical History:   Procedure Laterality Date     ADENOIDECTOMY  1969     TONSILLECTOMY  1969     TYMPANOPLASTY  2007     Family History   Problem Relation Age of Onset     DIABETES Father      Social History   Substance Use Topics     Smoking status: Never Smoker     Smokeless tobacco: Never Used     Alcohol use Yes      Comment: 1-2 drinks per week     Patient Supplied Answers to Review of Systems   ENT ROS 11/13/2017   Ears, Nose, Throat Hearing loss   The remainder of the 10 point review of systems was negative per the patient.    Physical examination:  Constitutional:  In no acute distress, appears stated age  Eyes:  Extraocular movements intact, no spontaneous nystagmus  Ears:  Both ears examined under the microscope.  The right mastoid bowl was debrided with a Egan needle and suction. The left TM is visible and intact with no effusion.    Respiratory:  No increased work of breathing, wheezing or stridor  Musculoskeletal:  Good upper extremity strength  Skin:  No rashes on the head and neck  Neurologic:  House Brackman 1/6 bilaterally, ambulating normally  Psychiatric:  Alert, normal affect, answering questions appropriately    Audiogram:  Right severe mixed loss with a mild sensorineural component, 88% speech discrimination.  Left mild sensorineural hearing loss with 80% speech discrimination.    Assessment and plan:  Rocio Forbes is found to be an appropriate candidate for a right  osseointegrated implant with sound processor given her large conductive loss and surgically altered ear.  The benefits and risks were discussed.  The risks include but are not limited to:  Wound infection/wound breakdown with possible need for further surgery and failure of osseointegration requiring further surgery.  There may be a need for prolonged wound cares after surgery.  We discussed the three to six month healing period before the external processor can be fitted.  The patient had her questions answered and wishes to proceed.    Noa Paredes MD  Otolaryngology Resident     I, Marcia Rasheed, saw this patient with the resident/fellow and agree with the resident s findings and plan of care as documented in the resident s/fellow s note. I was present for the entire procedure.    Marcia Rasheed MD

## 2018-01-08 NOTE — LETTER
1/8/2018       RE: Rocio Forbes  [address] [address] [address]     Dear Colleague,    Thank you for referring your patient, Roico Forbes, to the [facility] EAR NOSE AND THROAT at [facility] Please see a copy of my visit note below.    ENT Clinic Note  1/8/2018    Rocio Forbes is a 62 year old year old female who is seen in consultation from Dr. Cai regarding an osseointegrated implant with sound processor.  She has a mixed loss in the in the right ear and a sensorineural loss in the in the left ear.  A hearing aid was tried unsuccessfully.  There is no otalgia or otorrhea.  There is no vertigo/disequilibrium.    Past Medical History:   Diagnosis Date     Depressive disorder 2004     Hearing problem 1969     Recurrent otitis media 1969     Past Surgical History:   Procedure Laterality Date     ADENOIDECTOMY  1969     TONSILLECTOMY  1969     TYMPANOPLASTY  2007     Family History   Problem Relation Age of Onset     DIABETES Father      Social History   Substance Use Topics     Smoking status: Never Smoker     Smokeless tobacco: Never Used     Alcohol use Yes      Comment: 1-2 drinks per week     Patient Supplied Answers to Review of Systems   ENT ROS 11/13/2017   Ears, Nose, Throat Hearing loss   The remainder of the 10 point review of systems was negative per the patient.    Physical examination:  Constitutional:  In no acute distress, appears stated age  Eyes:  Extraocular movements intact, no spontaneous nystagmus  Ears:  Both ears examined under the microscope.  The right mastoid bowl was debrided with a Egan needle and suction. The left TM is visible and intact with no effusion.    Respiratory:  No increased work of breathing, wheezing or stridor  Musculoskeletal:  Good upper extremity strength  Skin:  No rashes on the head and neck  Neurologic:  House Brackman 1/6 bilaterally, ambulating normally  Psychiatric:  Alert, normal affect, answering questions  appropriately    Audiogram:  Right severe mixed loss with a mild sensorineural component, 88% speech discrimination.  Left mild sensorineural hearing loss with 80% speech discrimination.    Assessment and plan:  Rocio Forbes is found to be an appropriate candidate for a right osseointegrated implant with sound processor given her large conductive loss and surgically altered ear.  The benefits and risks were discussed.  The risks include but are not limited to:  Wound infection/wound breakdown with possible need for further surgery and failure of osseointegration requiring further surgery.  There may be a need for prolonged wound cares after surgery.  We discussed the three to six month healing period before the external processor can be fitted.  The patient had her questions answered and wishes to proceed.    Noa Paredes MD  Otolaryngology Resident     I, Marcia Rasheed, saw this patient with the resident/fellow and agree with the resident s findings and plan of care as documented in the resident s/fellow s note. I was present for the entire procedure.    Marcia Rasheed MD

## 2018-01-08 NOTE — PATIENT INSTRUCTIONS
Patient to review pre operative information.   Patient to schedule a pre-op physical with their primary MD or with our Preoperative Assessment Clinic within 30 days of the procedure.    Patient to avoid blood thinning medications 1 week prior to surgery (Ibuprofen, Aleve, Aspirin, fish oil )   Use the antiseptic scrub as directed.   You will need to schedule a post operative appointment for 3 weeks after surgery    Patient to call the ENT clinic with further questions or concerns:   ENT Triage Nurse  [phone] option 3  ENT appointment scheduling [phone] option 1  ENT surgery scheduling, Maria Ines [phone]  ENT Nurse Tracie [phone]

## 2018-01-08 NOTE — MR AVS SNAPSHOT
After Visit Summary   1/8/2018    Rocio Forbes    MRN: [id number]           Patient Information     Date Of Birth          1955        Visit Information        Provider Department      1/8/2018 8:30 AM Corinna Chin [facility]D [facility] Audiology        Today's Diagnoses     Sensorineural hearing loss (SNHL) of left ear with restricted hearing of right ear        Mixed conductive and sensorineural hearing loss of right ear with restricted hearing of left ear           Follow-ups after your visit        Your next 10 appointments already scheduled     Jan 08, 2018 10:00 AM CST   (Arrive by 9:45 AM)   RETURN NEUROTOLOGY with Marcia Rasheed MD   [facility] Ear Nose and Throat ([facility])    [address]  [address] [facility] [address] [phone]              Who to contact     Please call your clinic at [phone] to:    Ask questions about your health    Make or cancel appointments    Discuss your medicines    Learn about your test results    Speak to your doctor   If you have compliments or concerns about an experience at your clinic, or if you wish to file a complaint, please contact [facility] Patient Relations at [phone] or email us at [email or web address]         Additional Information About Your Visit        MyChart Information     [email or web address] ([employer])t gives you secure access to your electronic health record. If you see a primary care provider, you can also send messages to your care team and make appointments. If you have questions, please call your primary care clinic.  If you do not have a primary care provider, please call [phone] and they will assist you.      [employer] is an electronic gateway that provides easy, online access to your medical records. With [employer], you can request a clinic appointment, read your test results, renew a prescription or communicate with your care team.     To access your existing  account, please contact your [facility] or call [phone] for assistance.        Care EveryWhere ID     This is your Care EveryWhere ID. This could be used by other organizations to access your [place] medical records  [id number]         Blood Pressure from Last 3 Encounters:   No data found for BP    Weight from Last 3 Encounters:   11/13/17 70.3 kg (155 lb)              We Performed the Following     Hearing Aid Exam, Monaural (24610)        Primary Care Provider    None Specified       No primary provider on file.        Equal Access to Services     [facility] Hospital[facility]RISTINE : Hadii aad ku hadasho Soomaali, waaxda luqadaha, qaybta kaalmada adeegyada, tata treviñoin hayshonda raymond . So [facility] [phone].    ATENCIÓN: Si habla español, tiene a reynoso disposición servicios gratuitos de asistencia lingüística. Llame al [phone].    We comply with applicable federal civil rights laws and [place] laws. We do not discriminate on the basis of race, color, national origin, age, disability, sex, sexual orientation, or gender identity.            Thank you!     Thank you for choosing [facility] AUDIOLOGY  for your care. Our goal is always to provide you with excellent care. Hearing back from our patients is one way we can continue to improve our services. Please take a few minutes to complete the written survey that you may receive in the mail after your visit with us. Thank you!             Your Updated Medication List - Protect others around you: Learn how to safely use, store and throw away your medicines at www.disposemymeds.org.          This list is accurate as of: 1/8/18  9:24 AM.  Always use your most recent med list.                   Brand Name Dispense Instructions for use Diagnosis    citalopram 10 MG tablet    celeXA     20 mg daily

## 2018-01-08 NOTE — NURSING NOTE
Chief Complaint   Patient presents with     RECHECK     ear drainage and baha check     Eleanor Argueta Medical Assistant

## 2018-04-10 ENCOUNTER — ANESTHESIA EVENT (OUTPATIENT)
Dept: SURGERY | Facility: AMBULATORY SURGERY CENTER | Age: 63
End: 2018-04-10

## 2018-04-11 ENCOUNTER — SURGERY (OUTPATIENT)
Age: 63
End: 2018-04-11
Payer: COMMERCIAL

## 2018-04-11 ENCOUNTER — HOSPITAL ENCOUNTER (OUTPATIENT)
Facility: AMBULATORY SURGERY CENTER | Age: 63
End: 2018-04-11
Attending: OTOLARYNGOLOGY
Payer: COMMERCIAL

## 2018-04-11 ENCOUNTER — ANESTHESIA (OUTPATIENT)
Dept: SURGERY | Facility: AMBULATORY SURGERY CENTER | Age: 63
End: 2018-04-11

## 2018-04-11 VITALS
OXYGEN SATURATION: 98 % | SYSTOLIC BLOOD PRESSURE: 101 MMHG | BODY MASS INDEX: 31.7 KG/M2 | DIASTOLIC BLOOD PRESSURE: 42 MMHG | HEIGHT: 58 IN | TEMPERATURE: 97 F | RESPIRATION RATE: 16 BRPM | WEIGHT: 151 LBS

## 2018-04-11 DIAGNOSIS — G89.18 POSTOPERATIVE PAIN: Primary | ICD-10-CM

## 2018-04-11 RX ORDER — ACETAMINOPHEN 325 MG/1
975 TABLET ORAL ONCE
Status: COMPLETED | OUTPATIENT
Start: 2018-04-11 | End: 2018-04-11

## 2018-04-11 RX ORDER — MEPERIDINE HYDROCHLORIDE 25 MG/ML
12.5 INJECTION INTRAMUSCULAR; INTRAVENOUS; SUBCUTANEOUS
Status: DISCONTINUED | OUTPATIENT
Start: 2018-04-11 | End: 2018-04-12 | Stop reason: HOSPADM

## 2018-04-11 RX ORDER — HYDROCODONE BITARTRATE AND ACETAMINOPHEN 5; 325 MG/1; MG/1
1 TABLET ORAL EVERY 6 HOURS PRN
Qty: 8 TABLET | Refills: 0 | Status: SHIPPED | OUTPATIENT
Start: 2018-04-11 | End: 2018-04-18

## 2018-04-11 RX ORDER — NALOXONE HYDROCHLORIDE 0.4 MG/ML
.1-.4 INJECTION, SOLUTION INTRAMUSCULAR; INTRAVENOUS; SUBCUTANEOUS
Status: DISCONTINUED | OUTPATIENT
Start: 2018-04-11 | End: 2018-04-12 | Stop reason: HOSPADM

## 2018-04-11 RX ORDER — HYDROCODONE BITARTRATE AND ACETAMINOPHEN 5; 325 MG/1; MG/1
1 TABLET ORAL
Status: DISCONTINUED | OUTPATIENT
Start: 2018-04-11 | End: 2018-04-12 | Stop reason: HOSPADM

## 2018-04-11 RX ORDER — LIDOCAINE HYDROCHLORIDE 20 MG/ML
INJECTION, SOLUTION INFILTRATION; PERINEURAL PRN
Status: DISCONTINUED | OUTPATIENT
Start: 2018-04-11 | End: 2018-04-11

## 2018-04-11 RX ORDER — FENTANYL CITRATE 50 UG/ML
INJECTION, SOLUTION INTRAMUSCULAR; INTRAVENOUS PRN
Status: DISCONTINUED | OUTPATIENT
Start: 2018-04-11 | End: 2018-04-11

## 2018-04-11 RX ORDER — LIDOCAINE 40 MG/G
CREAM TOPICAL
Status: DISCONTINUED | OUTPATIENT
Start: 2018-04-11 | End: 2018-04-11 | Stop reason: HOSPADM

## 2018-04-11 RX ORDER — ONDANSETRON 2 MG/ML
INJECTION INTRAMUSCULAR; INTRAVENOUS PRN
Status: DISCONTINUED | OUTPATIENT
Start: 2018-04-11 | End: 2018-04-11

## 2018-04-11 RX ORDER — ONDANSETRON 4 MG/1
4 TABLET, ORALLY DISINTEGRATING ORAL EVERY 30 MIN PRN
Status: DISCONTINUED | OUTPATIENT
Start: 2018-04-11 | End: 2018-04-12 | Stop reason: HOSPADM

## 2018-04-11 RX ORDER — SODIUM CHLORIDE, SODIUM LACTATE, POTASSIUM CHLORIDE, CALCIUM CHLORIDE 600; 310; 30; 20 MG/100ML; MG/100ML; MG/100ML; MG/100ML
INJECTION, SOLUTION INTRAVENOUS CONTINUOUS
Status: DISCONTINUED | OUTPATIENT
Start: 2018-04-11 | End: 2018-04-11 | Stop reason: HOSPADM

## 2018-04-11 RX ORDER — FENTANYL CITRATE 50 UG/ML
25-50 INJECTION, SOLUTION INTRAMUSCULAR; INTRAVENOUS
Status: DISCONTINUED | OUTPATIENT
Start: 2018-04-11 | End: 2018-04-12 | Stop reason: HOSPADM

## 2018-04-11 RX ORDER — OXYCODONE HYDROCHLORIDE 5 MG/1
5 TABLET ORAL EVERY 4 HOURS PRN
Status: DISCONTINUED | OUTPATIENT
Start: 2018-04-11 | End: 2018-04-12 | Stop reason: HOSPADM

## 2018-04-11 RX ORDER — SODIUM CHLORIDE, SODIUM LACTATE, POTASSIUM CHLORIDE, CALCIUM CHLORIDE 600; 310; 30; 20 MG/100ML; MG/100ML; MG/100ML; MG/100ML
INJECTION, SOLUTION INTRAVENOUS CONTINUOUS
Status: DISCONTINUED | OUTPATIENT
Start: 2018-04-11 | End: 2018-04-12 | Stop reason: HOSPADM

## 2018-04-11 RX ORDER — DEXAMETHASONE SODIUM PHOSPHATE 10 MG/ML
10 INJECTION, SOLUTION INTRAMUSCULAR; INTRAVENOUS ONCE
Status: COMPLETED | OUTPATIENT
Start: 2018-04-11 | End: 2018-04-11

## 2018-04-11 RX ORDER — PROPOFOL 10 MG/ML
INJECTION, EMULSION INTRAVENOUS PRN
Status: DISCONTINUED | OUTPATIENT
Start: 2018-04-11 | End: 2018-04-11

## 2018-04-11 RX ORDER — ACETAMINOPHEN 325 MG/1
650 TABLET ORAL
Status: DISCONTINUED | OUTPATIENT
Start: 2018-04-11 | End: 2018-04-12 | Stop reason: HOSPADM

## 2018-04-11 RX ORDER — FENTANYL CITRATE 50 UG/ML
25-50 INJECTION, SOLUTION INTRAMUSCULAR; INTRAVENOUS
Status: DISCONTINUED | OUTPATIENT
Start: 2018-04-11 | End: 2018-04-11 | Stop reason: HOSPADM

## 2018-04-11 RX ORDER — LIDOCAINE HYDROCHLORIDE AND EPINEPHRINE 10; 10 MG/ML; UG/ML
INJECTION, SOLUTION INFILTRATION; PERINEURAL PRN
Status: DISCONTINUED | OUTPATIENT
Start: 2018-04-11 | End: 2018-04-11 | Stop reason: HOSPADM

## 2018-04-11 RX ORDER — ONDANSETRON 2 MG/ML
4 INJECTION INTRAMUSCULAR; INTRAVENOUS EVERY 30 MIN PRN
Status: DISCONTINUED | OUTPATIENT
Start: 2018-04-11 | End: 2018-04-12 | Stop reason: HOSPADM

## 2018-04-11 RX ORDER — GABAPENTIN 300 MG/1
300 CAPSULE ORAL ONCE
Status: COMPLETED | OUTPATIENT
Start: 2018-04-11 | End: 2018-04-11

## 2018-04-11 RX ADMIN — PROPOFOL 20 MG: 10 INJECTION, EMULSION INTRAVENOUS at 08:46

## 2018-04-11 RX ADMIN — PROPOFOL 20 MG: 10 INJECTION, EMULSION INTRAVENOUS at 08:33

## 2018-04-11 RX ADMIN — GABAPENTIN 300 MG: 300 CAPSULE ORAL at 08:09

## 2018-04-11 RX ADMIN — PROPOFOL 20 MG: 10 INJECTION, EMULSION INTRAVENOUS at 08:36

## 2018-04-11 RX ADMIN — PROPOFOL 20 MG: 10 INJECTION, EMULSION INTRAVENOUS at 08:53

## 2018-04-11 RX ADMIN — ACETAMINOPHEN 975 MG: 325 TABLET ORAL at 08:09

## 2018-04-11 RX ADMIN — PROPOFOL 20 MG: 10 INJECTION, EMULSION INTRAVENOUS at 08:35

## 2018-04-11 RX ADMIN — PROPOFOL 20 MG: 10 INJECTION, EMULSION INTRAVENOUS at 08:40

## 2018-04-11 RX ADMIN — PROPOFOL 20 MG: 10 INJECTION, EMULSION INTRAVENOUS at 08:41

## 2018-04-11 RX ADMIN — LIDOCAINE HYDROCHLORIDE 60 MG: 20 INJECTION, SOLUTION INFILTRATION; PERINEURAL at 08:31

## 2018-04-11 RX ADMIN — PROPOFOL 20 MG: 10 INJECTION, EMULSION INTRAVENOUS at 08:34

## 2018-04-11 RX ADMIN — PROPOFOL 20 MG: 10 INJECTION, EMULSION INTRAVENOUS at 08:56

## 2018-04-11 RX ADMIN — PROPOFOL 20 MG: 10 INJECTION, EMULSION INTRAVENOUS at 08:50

## 2018-04-11 RX ADMIN — PROPOFOL 20 MG: 10 INJECTION, EMULSION INTRAVENOUS at 08:37

## 2018-04-11 RX ADMIN — SODIUM CHLORIDE, SODIUM LACTATE, POTASSIUM CHLORIDE, CALCIUM CHLORIDE: 600; 310; 30; 20 INJECTION, SOLUTION INTRAVENOUS at 08:10

## 2018-04-11 RX ADMIN — PROPOFOL 20 MG: 10 INJECTION, EMULSION INTRAVENOUS at 08:30

## 2018-04-11 RX ADMIN — FENTANYL CITRATE 50 MCG: 50 INJECTION, SOLUTION INTRAMUSCULAR; INTRAVENOUS at 08:29

## 2018-04-11 RX ADMIN — PROPOFOL 20 MG: 10 INJECTION, EMULSION INTRAVENOUS at 08:39

## 2018-04-11 RX ADMIN — DEXAMETHASONE SODIUM PHOSPHATE 10 MG: 10 INJECTION, SOLUTION INTRAMUSCULAR; INTRAVENOUS at 08:32

## 2018-04-11 RX ADMIN — PROPOFOL 20 MG: 10 INJECTION, EMULSION INTRAVENOUS at 08:32

## 2018-04-11 RX ADMIN — LIDOCAINE HYDROCHLORIDE AND EPINEPHRINE 1 ML: 10; 10 INJECTION, SOLUTION INFILTRATION; PERINEURAL at 08:51

## 2018-04-11 RX ADMIN — PROPOFOL 20 MG: 10 INJECTION, EMULSION INTRAVENOUS at 08:38

## 2018-04-11 RX ADMIN — ONDANSETRON 4 MG: 2 INJECTION INTRAMUSCULAR; INTRAVENOUS at 08:33

## 2018-04-11 ASSESSMENT — LIFESTYLE VARIABLES: TOBACCO_USE: 0

## 2018-04-11 NOTE — ANESTHESIA POSTPROCEDURE EVALUATION
Patient: Rocio Forbes    Procedure(s):  Osseointegrated implant placement with the Oticon Device, Right - Wound Class: I-Clean    Diagnosis:Hearing Loss  Diagnosis Additional Information: No value filed.    Anesthesia Type:  MAC    Note:  Anesthesia Post Evaluation    Patient location during evaluation: Phase 2  Patient participation: Able to fully participate in evaluation  Level of consciousness: awake and alert  Pain management: adequate  Airway patency: patent  Cardiovascular status: acceptable  Respiratory status: acceptable  Hydration status: acceptable  PONV: none     Anesthetic complications: None          Last vitals:  Vitals:    04/11/18 0921 04/11/18 0924 04/11/18 0943   BP: 91/58 102/45 101/42   Resp:  16 16   Temp:  36.1  C (97  F) 36.1  C (97  F)   SpO2:  98% 98%         Electronically Signed By: Demario Cool DO  April 11, 2018  1:47 PM

## 2018-04-11 NOTE — IP AVS SNAPSHOT
[facility]    [address] [place] [address]    Phone:  [phone]    Fax:  [phone]                                       After Visit Summary   4/11/2018    Rocio Forbes    MRN: [id number]           After Visit Summary Signature Page     I have received my discharge instructions, and my questions have been answered. I have discussed any challenges I see with this plan with the nurse or doctor.    ..........................................................................................................................................  Patient/Patient Representative Signature      ..........................................................................................................................................  Patient Representative Print Name and Relationship to Patient    ..................................................               ................................................  Date                                            Time    ..........................................................................................................................................  Reviewed by Signature/Title    ...................................................              ..............................................  Date                                                            Time

## 2018-04-11 NOTE — OP NOTE
Date of service:  4/11/18    Preoperative diagnosis: Right mixed hearing loss    Postoperative diagnosis: Right mixed hearing loss    Procedure:  Right osseointegrated implant with the Oticon device    Surgeon:  Marcia Rasheed MD    Resident:  Andres Parekh MD    Anesthesia:  MAC plus local    EBL:  5cc    Specimens:  None    Complications:  None    Findings:  4mm implant with 9mm abutment placed    Indications:  Rocio Forbes has a history of mixed hearing loss with a mastoid cavity on the right.  She was evaluated and found to be a candidate for an osseointegrated implant with sound processor.  The risks and benefits were discussed with the patient and informed consent was obtained.    Procedure:  The patient was taken to the operating room, placed supine on the operating room table and sedation was given.  Time Out was then performed with confirmation of the patient, site and procedure.  The area behind the ear had been marked and the skin thickness measured.  1% lidocaine with 1:100,000 epinephrine was injected into the area.  The area was then prepped and draped in standard surgical fashion. A 4mm punch was used to remove the skin.  A linear incision was created 1cm above and below the punch.  A cruciate incision was made in the periosteum over the area of the implant and elevated off the skull.  The 3/4mm guide drill was used to drill a 3 mm hole.  No dura was encountered.  The guard was removed and the hole was deepened to 4 mm.  The 4 mm countersink was drilled.  The 4 mm implant with 9 mm abutment was placed with good contact with the bone. The skin was then closed using nylon suture.  Allevyn was placed over the skin and a healing cap placed to hold the Allevyn in position.  The patient tolerated the procedure well and there were no complications.  She was returned to anesthesia and taken to the PACU in stable condition.    IMarcia, was present during the entire procedure.

## 2018-04-11 NOTE — ANESTHESIA PREPROCEDURE EVALUATION
Anesthesia Evaluation     . Pt has had prior anesthetic. Type: General    No history of anesthetic complications          ROS/MED HX    ENT/Pulmonary:  - neg pulmonary ROS    (-) tobacco use   Neurologic: Comment: H/o tympanoplasty - neg neurologic ROS     Cardiovascular:  - neg cardiovascular ROS   (+) ----. : . . . :. . Previous cardiac testing date:results:date: results:ECG reviewed date:2018 results:NSR with PACs date: results:          METS/Exercise Tolerance:  >4 METS   Hematologic:  - neg hematologic  ROS       Musculoskeletal:  - neg musculoskeletal ROS       GI/Hepatic:  - neg GI/hepatic ROS       Renal/Genitourinary:  - ROS Renal section negative       Endo:  - neg endo ROS       Psychiatric:  - neg psychiatric ROS   (+) psychiatric history depression      Infectious Disease:  - neg infectious disease ROS       Malignancy:      - no malignancy   Other:    - neg other ROS                 Physical Exam  Normal systems: cardiovascular, pulmonary and dental    Airway   Mallampati: I  TM distance: >3 FB  Neck ROM: full    Dental     Cardiovascular   Rhythm and rate: regular and normal      Pulmonary    breath sounds clear to auscultation             Procedure: Procedure(s):  Right BAHA- 3SP BAHA [facility] - Wound Class: I-Clean    HPI: 62 year old female requires anesthesia for Procedure(s):  Right BAHA- 3SP BAHA [facility] - Wound Class: I-Clean.     PMH/PSH:  Past Medical History:   Diagnosis Date     Depressive disorder 2004     Hearing problem 1969     Recurrent otitis media 1969       Past Surgical History:   Procedure Laterality Date     ADENOIDECTOMY  1969     TONSILLECTOMY  1969     TYMPANOPLASTY  2007         Current Outpatient Prescriptions on File Prior to Encounter:  estradiol biweekly (VIVELLE-DOT) Patch in Place    citalopram (CELEXA) 10 MG tablet 20 mg daily      No current facility-administered medications on file prior to encounter.     SH:   Social History   Substance  Use Topics     Smoking status: Never Smoker     Smokeless tobacco: Never Used     Alcohol use Yes      Comment: 1-2 drinks per week       Allergies:   Allergies   Allergen Reactions     Soap Dermatitis       NPO Status: Per ASA Guidelines         Anesthesia Plan      History & Physical Review  History and physical reviewed and following examination; no interval change.    ASA Status:  2 .    NPO Status:  > 8 hours    Plan for MAC with Intravenous and Propofol induction. Maintenance will be Balanced.    PONV prophylaxis:  Ondansetron (or other 5HT-3) and Dexamethasone or Solumedrol  To be discussed with staff.   - ASA 2  - MAC with standard ASA monitors, IV induction, balanced anesthetic  - PIVx1  - Antibiotics per surgery  - PONV prophylaxis    Raymond Hdz MD  Anesthesiology resident   [facility]        Postoperative Care  Postoperative pain management:  IV analgesics and Multi-modal analgesia.      Consents  Anesthetic plan, risks, benefits and alternatives discussed with:  Patient.  Use of blood products discussed: No .   .                        .

## 2018-04-11 NOTE — BRIEF OP NOTE
[facility]    Brief Operative Note    Pre-operative diagnosis: Hearing Loss  Post-operative diagnosis same  Procedure: Procedure(s):  Osseointegrated implant placement with the Oticon Device, Right - Wound Class: I-Clean  Surgeon: Surgeon(s) and Role:     * Marcia Rasheed MD - Primary  Anesthesia: Monitor Anesthesia Care   Estimated blood loss: 5cc  Drains: None  Specimens: * No specimens in log *  Findings:   None.  Complications: None.  Implants: 4mm implant with a 9mm abutment

## 2018-04-11 NOTE — DISCHARGE INSTRUCTIONS
[facility]  Home Care Following Anesthesia  For 24 hours after surgery:  1. Get plenty of rest.  A responsible adult must stay with you for at least 24 hours after you leave the surgery center.  2. Do not drive or use heavy equipment.  If you have weakness or tingling, don't drive or use heavy equipment until this feeling goes away.   3. Do not drink alcohol.   4. Avoid strenuous or risky activities.  Ask for help when climbing stairs.  5. You may feel lightheaded.  IF so, sit for a few minutes before standing.  Have someone help you get up.   6. If you have nausea (feel sick to your stomach): Drink only clear liquids such as apple juice, ginger ale, broth or 7-Up.  Rest may also help.  Be sure to drink enough fluids.  Move to a regular diet as you feel able.   7. You may have a slight fever.  Call the doctor if your fever is over 100 F (37.7 C) (taken under the tongue) or lasts longer than 24 hours.  8. You may have a dry mouth, a sore throat, muscle aches or trouble sleeping. These should go away after 24 hours.  9. Do not make important or legal decisions.               Tips for taking pain medications  To get the best pain relief possible, remember these points:    Take pain medications as directed, before pain becomes severe.    Pain medication can upset your stomach: taking it with food may help.    Constipation is a common side effect of pain medication. Drink plenty of  fluids.    Eat foods high in fiber. Take a stool softener if recommended by your doctor or pharmacist.    Do not drink alcohol, drive or operate machinery while taking pain medications.    Ask about other ways to control pain, such as with heat, ice or relaxation.    Tylenol/Acetaminophen Consumption  To help encourage the safe use of acetaminophen, the makers of TYLENOL  have lowered the maximum daily dose for single-ingredient Extra Strength TYLENOL  (acetaminophen) products sold in the [place] from 8  pills per day (4,000 mg) to 6 pills per day (3,000 mg). The dosing interval has also changed from 2 pills every 4-6 hours to 2 pills every 6 hours.    If you feel your pain relief is insufficient, you may take Tylenol/Acetaminophen in addition to your narcotic pain medication.     Be careful not to exceed 3,000 mg of Tylenol/Acetaminophen in a 24 hour period from all sources.    If you are taking extra strength Tylenol/acetaminophen (500 mg), the maximum dose is 6 tablets in 24 hours.    If you are taking regular strength acetaminophen (325 mg), the maximum dose is 9 tablets in 24 hours.    Call a doctor for any of the followin. Signs of infection (fever, growing tenderness at the surgery site, a large amount of drainage or bleeding, severe pain, foul-smelling drainage, redness, swelling).  2. It has been over 8 to 10 hours since surgery and you are still not able to urinate (pass water).  3. Headache for over 24 hours.  4. Numbness, tingling or weakness the day after surgery (if you had spinal anesthesia).  Your doctor is:  Dr. Marcia Rasheed, ENT Otolaryngology: [phone]                    Or dial [phone] and ask for the resident on call for:  ENT Otolaryngology  For emergency care, call the:  [place] [facility]:  [phone] (TTY for hearing impaired: [phone])            1.  No showering or getting the incision wet until the dressing is removed in clinic.    2.  Take pain medication as needed.    3.  No strenuous activity for one week.    4.  Follow up with Dr. Rasheed as previously scheduled.    5.  Please call the ENT clinic if the incision drains, becomes more painful over time, becomes redder over time and the redness spreads or for fever > 101.5.  If it is the evening or weekend, please call [phone] and ask for the ENT resident on call.

## 2018-04-11 NOTE — ANESTHESIA CARE TRANSFER NOTE
Patient: Rocio Forbes    Procedure(s):  Osseointegrated implant placement with the Oticon Device, Right - Wound Class: I-Clean    Diagnosis: Hearing Loss  Diagnosis Additional Information: No value filed.    Anesthesia Type:   MAC     Note:  Airway :Room Air  Patient transferred to:Phase II  Comments: Arrive Phase II, Stable, Airway Intact  90/48, 72,16,96,98.4  All questions answered.      Vitals: (Last set prior to Anesthesia Care Transfer)    CRNA VITALS  4/11/2018 0836 - 4/11/2018 0910      4/11/2018             Pulse: 70    SpO2: 93 %    Resp Rate (set): 10                Electronically Signed By: LISE Norton CRNA  April 11, 2018  9:10 AM

## 2018-04-11 NOTE — IP AVS SNAPSHOT
MRN:[id number]                      After Visit Summary   4/11/2018    Rocio Forbes    MRN: [id number]           Thank you!     Thank you for choosing [facility] for your care. Our goal is always to provide you with excellent care. Hearing back from our patients is one way we can continue to improve our services. Please take a few minutes to complete the written survey that you may receive in the mail after you visit with us. Thank you!        Patient Information     Date Of Birth          1955        About your hospital stay     You were admitted on:  April 11, 2018 You last received care in the:  [facility]    You were discharged on:  April 11, 2018       Who to Call     For medical emergencies, please call 911.  For non-urgent questions about your medical care, please call your primary care provider or clinic, [phone]  For questions related to your surgery, please call your surgery clinic        Attending Provider     Provider Specialty    Marcia Rasheed MD Otolaryngology       Primary Care Provider Office Phone # Fax #    Tre GRIER Detert [phone]      Your next 10 appointments already scheduled     Apr 18, 2018  8:30 AM CDT   (Arrive by 8:15 AM)   Return Visit with Marcia Rasheed MD   [facility] ([facility])    [address] [place] [address] [phone]              Further instructions from your care team       [facility]  Home Care Following Anesthesia  For 24 hours after surgery:  1. Get plenty of rest.  A responsible adult must stay with you for at least 24 hours after you leave the surgery center.  2. Do not drive or use heavy equipment.  If you have weakness or tingling, don't drive or use heavy equipment until this feeling goes away.   3. Do not drink alcohol.   4. Avoid strenuous or risky activities.  Ask for help when  climbing stairs.  5. You may feel lightheaded.  IF so, sit for a few minutes before standing.  Have someone help you get up.   6. If you have nausea (feel sick to your stomach): Drink only clear liquids such as apple juice, ginger ale, broth or 7-Up.  Rest may also help.  Be sure to drink enough fluids.  Move to a regular diet as you feel able.   7. You may have a slight fever.  Call the doctor if your fever is over 100 F (37.7 C) (taken under the tongue) or lasts longer than 24 hours.  8. You may have a dry mouth, a sore throat, muscle aches or trouble sleeping. These should go away after 24 hours.  9. Do not make important or legal decisions.               Tips for taking pain medications  To get the best pain relief possible, remember these points:    Take pain medications as directed, before pain becomes severe.    Pain medication can upset your stomach: taking it with food may help.    Constipation is a common side effect of pain medication. Drink plenty of  fluids.    Eat foods high in fiber. Take a stool softener if recommended by your doctor or pharmacist.    Do not drink alcohol, drive or operate machinery while taking pain medications.    Ask about other ways to control pain, such as with heat, ice or relaxation.    Tylenol/Acetaminophen Consumption  To help encourage the safe use of acetaminophen, the makers of TYLENOL  have lowered the maximum daily dose for single-ingredient Extra Strength TYLENOL  (acetaminophen) products sold in the [place] from 8 pills per day (4,000 mg) to 6 pills per day (3,000 mg). The dosing interval has also changed from 2 pills every 4-6 hours to 2 pills every 6 hours.    If you feel your pain relief is insufficient, you may take Tylenol/Acetaminophen in addition to your narcotic pain medication.     Be careful not to exceed 3,000 mg of Tylenol/Acetaminophen in a 24 hour period from all sources.    If you are taking extra strength Tylenol/acetaminophen (500 mg), the maximum dose  "is 6 tablets in 24 hours.    If you are taking regular strength acetaminophen (325 mg), the maximum dose is 9 tablets in 24 hours.    Call a doctor for any of the followin. Signs of infection (fever, growing tenderness at the surgery site, a large amount of drainage or bleeding, severe pain, foul-smelling drainage, redness, swelling).  2. It has been over 8 to 10 hours since surgery and you are still not able to urinate (pass water).  3. Headache for over 24 hours.  4. Numbness, tingling or weakness the day after surgery (if you had spinal anesthesia).  Your doctor is:  Dr. Marcia Rsaheed, ENT Otolaryngology: [phone]                    Or dial [phone] and ask for the resident on call for:  ENT Otolaryngology  For emergency care, call the:  [facility]:  [phone] (TTY for hearing impaired: [phone])            1.  No showering or getting the incision wet until the dressing is removed in clinic.    2.  Take pain medication as needed.    3.  No strenuous activity for one week.    4.  Follow up with Dr. Rasheed as previously scheduled.    5.  Please call the ENT clinic if the incision drains, becomes more painful over time, becomes redder over time and the redness spreads or for fever > 101.5.  If it is the evening or weekend, please call [phone] and ask for the ENT resident on call.    Pending Results     No orders found from 2018 to 2018.            Admission Information     Date & Time Provider Department Dept. Phone    2018 Marcia Rasheed MD [facility] [phone]      Your Vitals Were     Blood Pressure Temperature Respirations Height Weight Pulse Oximetry    83/45 97  F (36.1  C) (Temporal) 16 1.473 m (4' 10\") 68.5 kg (151 lb) 97%    BMI (Body Mass Index)                   31.56 kg/m2           [email or web address] Information     [employer] gives you secure access to your electronic health record. If you see a primary care provider, you can also " send messages to your care team and make appointments. If you have questions, please call your primary care clinic.  If you do not have a primary care provider, please call [phone] and they will assist you.      [employer] is an electronic gateway that provides easy, online access to your medical records. With [employer], you can request a clinic appointment, read your test results, renew a prescription or communicate with your care team.     To access your existing account, please contact your [facility] or call [phone] for assistance.        Care EveryWhere ID     This is your Care EveryWhere ID. This could be used by other organizations to access your [facility] medical records  [id number]        Equal Access to Services     CHUCK SOL : Franchesca Nur, carito wesley, fadia corcoran, tata solano. So [facility] [phone].    ATENCIÓN: Si habla español, tiene a reynoso disposición servicios gratuitos de asistencia lingüística. Llame al [phone].    We comply with applicable federal civil rights laws and [place] laws. We do not discriminate on the basis of race, color, national origin, age, disability, sex, sexual orientation, or gender identity.               Review of your medicines      START taking        Dose / Directions    HYDROcodone-acetaminophen 5-325 MG per tablet   Commonly known as:  NORCO   Used for:  Postoperative pain        Dose:  1 tablet   Take 1 tablet by mouth every 6 hours as needed for severe pain maximum 6 tablet(s) per day   Quantity:  8 tablet   Refills:  0         CONTINUE these medicines which have NOT CHANGED        Dose / Directions    citalopram 10 MG tablet   Commonly known as:  celeXA        Dose:  20 mg   20 mg daily   Refills:  0       estradiol biweekly   Commonly known as:  VIVELLE-DOT        Refills:  0            Where to get your medicines      Some of these will need a paper  prescription and others can be bought over the counter. Ask your nurse if you have questions.     Bring a paper prescription for each of these medications     HYDROcodone-acetaminophen 5-325 MG per tablet                Protect others around you: Learn how to safely use, store and throw away your medicines at www.disposemymeds.org.        Information about OPIOIDS     PRESCRIPTION OPIOIDS: WHAT YOU NEED TO KNOW    Prescription opioids can be used to help relieve moderate to severe pain and are often prescribed following a surgery or injury, or for certain health conditions. These medications can be an important part of treatment but also come with serious risks. It is important to work with your health care provider to make sure you are getting the safest, most effective care.    WHAT ARE THE RISKS AND SIDE EFFECTS OF OPIOID USE?  Prescription opioids carry serious risks of addiction and overdose, especially with prolonged use. An opioid overdose, often marked by slowed breathing can cause sudden death. The use of prescription opioids can have a number of side effects as well, even when taken as directed:      Tolerance - meaning you might need to take more of a medication for the same pain relief    Physical dependence - meaning you have symptoms of withdrawal when a medication is stopped    Increased sensitivity to pain    Constipation    Nausea, vomiting, and dry mouth    Sleepiness and dizziness    Confusion    Depression    Low levels of testosterone that can result in lower sex drive, energy, and strength    Itching and sweating    RISKS ARE GREATER WITH:    History of drug misuse, substance use disorder, or overdose    Mental health conditions (such as depression or anxiety)    Sleep apnea    Older age (65 years or older)    Pregnancy    Avoid alcohol while taking prescription opioids.   Also, unless specifically advised by your health care provider, medications to avoid include:    Benzodiazepines (such as  Xanax or Valium)    Muscle relaxants (such as Soma or Flexeril)    Hypnotics (such as Ambien or Lunesta)    Other prescription opioids    KNOW YOUR OPTIONS:  Talk to your health care provider about ways to manage your pain that do not involve prescription opioids. Some of these options may actually work better and have fewer risks and side effects:    Pain relievers such as acetaminophen, ibuprofen, and naproxen    Some medications that are also used for depression or seizures    Physical therapy and exercise    Cognitive behavioral therapy, a psychological, goal-directed approach, in which patients learn how to modify physical, behavioral, and emotional triggers of pain and stress    IF YOU ARE PRESCRIBED OPIOIDS FOR PAIN:    Never take opioids in greater amounts or more often than prescribed    Follow up with your primary health care provider and work together to create a plan on how to manage your pain.    Talk about ways to help manage your pain that do not involve prescription opioids    Talk about all concerns and side effects    Help prevent misuse and abuse    Never sell or share prescription opioids    Never use another person's prescription opioids    Store prescription opioids in a secure place and out of reach of others (this may include visitors, children, friends, and family)    Visit www.cdc.gov/drugoverdose to learn about risks of opioid abuse and overdose    If you believe you may be struggling with addiction, tell your health care provider and ask for guidance or call [facility]'s National Helpline at 5-800-052-HELP    LEARN MORE / www.cdc.gov/drugoverdose/prescribing/guideline.html    Safely dispose of unused prescription opioids: Find your local drug take-back programs and more information about the importance of safe disposal at www.doseofreality.mn.gov             Medication List: This is a list of all your medications and when to take them. Check marks below indicate your daily home schedule. Keep  this list as a reference.      Medications           Morning Afternoon Evening Bedtime As Needed    citalopram 10 MG tablet   Commonly known as:  celeXA   20 mg daily                                estradiol biweekly   Commonly known as:  VIVELLE-DOT                                HYDROcodone-acetaminophen 5-325 MG per tablet   Commonly known as:  NORCO   Take 1 tablet by mouth every 6 hours as needed for severe pain maximum 6 tablet(s) per day

## 2018-04-18 ENCOUNTER — OFFICE VISIT (OUTPATIENT)
Dept: OTOLARYNGOLOGY | Facility: CLINIC | Age: 63
End: 2018-04-18
Payer: COMMERCIAL

## 2018-04-18 VITALS — BODY MASS INDEX: 31.91 KG/M2 | HEIGHT: 58 IN | WEIGHT: 152 LBS

## 2018-04-18 DIAGNOSIS — H90.A31 MIXED CONDUCTIVE AND SENSORINEURAL HEARING LOSS OF RIGHT EAR WITH RESTRICTED HEARING OF LEFT EAR: Primary | ICD-10-CM

## 2018-04-18 RX ORDER — MUPIROCIN 20 MG/G
OINTMENT TOPICAL
Qty: 22 G | Refills: 0 | Status: SHIPPED | OUTPATIENT
Start: 2018-04-18 | End: 2018-04-28

## 2018-04-18 ASSESSMENT — PAIN SCALES - GENERAL: PAINLEVEL: NO PAIN (0)

## 2018-04-18 NOTE — NURSING NOTE
Right BAHA dressing removed- site clean,dry, intact- slight gap in skin at bottom of post top of BAHA.  2 sutures removed.  Reviewed BAHA cleaning/cares, pt understood.  Tracie Riojas RN  ENT Care Coordinator   Otology  [phone]  4/18/2018 9:51 AM

## 2018-04-18 NOTE — PATIENT INSTRUCTIONS
You will need  to schedule a follow up appointment in 2 weeks for a BAHA check  Please  the prescription for the ointment at our pharmacy.   Please call our clinic for any questions,concerns,or worsening symptoms.      Clinic #[phone]       Option 3  for the triage nurse.    CLEANING THE BAHA SITE : Use micro-klenz spray twice a day, when the bottle runs out use soap and water.  WATCH FOR/CALL THE CLINIC :Signs of infection- drainage, redness or pain.  CLEANING WITH A BRUSH:   Schedule your appointment with audiology for the device hook up (3 months post-op). Audiology # [phone]

## 2018-04-18 NOTE — MR AVS SNAPSHOT
After Visit Summary   4/18/2018    Rocio Forbes    MRN: [id number]           Patient Information     Date Of Birth          1955        Visit Information        Provider Department      4/18/2018 8:30 AM Marcia Rasheed MD [facility]        Today's Diagnoses     Mixed conductive and sensorineural hearing loss of right ear with restricted hearing of left ear    -  1      Care Instructions    You will need  to schedule a follow up appointment in 2 weeks for a BAHA check  Please  the prescription for the ointment at our pharmacy.   Please call our clinic for any questions,concerns,or worsening symptoms.      Clinic #[phone]       Option 3  for the triage nurse.    CLEANING THE BAHA SITE : Use micro-klenz spray twice a day, when the bottle runs out use soap and water.  WATCH FOR/CALL THE CLINIC :Signs of infection- drainage, redness or pain.  CLEANING WITH A BRUSH:   Schedule your appointment with audiology for the device hook up (3 months post-op). Audiology # [phone]            Follow-ups after your visit        Follow-up notes from your care team     Return in about 2 weeks (around 5/2/2018).      Your next 10 appointments already scheduled     May 10, 2018  2:45 PM CDT   (Arrive by 2:30 PM)   Return Visit with MD MARY LOU [facility] ([facility])    [address] [place] [address] [phone]            Aug 09, 2018  8:00 AM CDT   BAHA Follow Up with Natali Muhammad   [facility] ([facility])    [address] [place] [address] [phone]              Who to contact     Please call your clinic at [phone] to:    Ask questions about your health    Make or cancel appointments    Discuss your medicines    Learn about your test results    Speak to your doctor            Additional Information About Your Visit       "  MyChart Information     [employer] gives you secure access to your electronic health record. If you see a primary care provider, you can also send messages to your care team and make appointments. If you have questions, please call your primary care clinic.  If you do not have a primary care provider, please call [phone] and they will assist you.      [employer] is an electronic gateway that provides easy, online access to your medical records. With [employer], you can request a clinic appointment, read your test results, renew a prescription or communicate with your care team.     To access your existing account, please contact your [facility] or call [phone] for assistance.        Care EveryWhere ID     This is your Care EveryWhere ID. This could be used by other organizations to access your [facility] medical records  [id number]        Your Vitals Were     Height BMI (Body Mass Index)                1.47 m (4' 9.87\") 31.91 kg/m2           Blood Pressure from Last 3 Encounters:   04/11/18 101/42    Weight from Last 3 Encounters:   04/18/18 68.9 kg (152 lb)   04/11/18 68.5 kg (151 lb)   01/08/18 69.9 kg (154 lb)              Today, you had the following     No orders found for display         Today's Medication Changes          These changes are accurate as of 4/18/18  9:52 AM.  If you have any questions, ask your nurse or doctor.               Start taking these medicines.        Dose/Directions    mupirocin 2 % ointment   Commonly known as:  BACTROBAN   Started by:  Marcia Rasheed MD        Apply a small amount to [facility] twice a day for 2 weeks.   Quantity:  22 g   Refills:  0            Where to get your medicines      [facility] [address] [facility]  [address] [facility] [address]     Phone:  [phone]     mupirocin 2 % ointment                Primary Care Provider Office Phone # Fax #    Tre GRIER " Detert [phone]       [address]      [facility] [address]        Equal Access to Services     CHUCK SOL : Hadii aad ku hadzeeshano Sotristanali, waaxda luqadaha, qaybta kaalmada adekimda, tata brittmeaghan dexter carbajal laCarlosshonda solano. So [facility] [phone].    ATENCIÓN: Si habla español, tiene a reynoso disposición servicios gratuitos de asistencia lingüística. Llame al [phone].    We comply with applicable federal civil rights laws and [place] laws. We do not discriminate on the basis of race, color, national origin, age, disability, sex, sexual orientation, or gender identity.            Thank you!     Thank you for choosing [facility]  for your care. Our goal is always to provide you with excellent care. Hearing back from our patients is one way we can continue to improve our services. Please take a few minutes to complete the written survey that you may receive in the mail after your visit with us. Thank you!             Your Updated Medication List - Protect others around you: Learn how to safely use, store and throw away your medicines at www.disposemymeds.org.          This list is accurate as of 4/18/18  9:52 AM.  Always use your most recent med list.                   Brand Name Dispense Instructions for use Diagnosis    citalopram 10 MG tablet    celeXA     20 mg daily        estradiol biweekly    VIVELLE-DOT          mupirocin 2 % ointment    BACTROBAN    22 g    Apply a small amount to BAHA site twice a day for 2 weeks.

## 2018-04-18 NOTE — PROGRESS NOTES
Rocio Forbes is seen for her first postoperative visit after right osseointegrated implant with sound processor placement.  She had minimal pain and took none of her pain medication.  No drainage from the site.    Physical examination:  female in no acute distress.  Alert and answering questions appropriately.  HB 1/6 bilaterally.  Dressing taken off the right scalp.  Abutment solid to shake test.  There is an opening in the scalp superiorly between the abutment and the edge of the scalp, no erythema or purulence around the area.    Assessment and plan:  Healing as expected with a gap along the superior portion of the abutment.  We'll have her use Bactroban over the area and see her back in 2 weeks.

## 2018-04-18 NOTE — LETTER
4/18/2018      RE: Rocio Forbes  [address] [address] [address]       Rocio Forbes is seen for her first postoperative visit after right osseointegrated implant with sound processor placement.  She had minimal pain and took none of her pain medication.  No drainage from the site.    Physical examination:  female in no acute distress.  Alert and answering questions appropriately.  HB 1/6 bilaterally.  Dressing taken off the right scalp.  Abutment solid to shake test.  There is an opening in the scalp superiorly between the abutment and the edge of the scalp, no erythema or purulence around the area.    Assessment and plan:  Healing as expected with a gap along the superior portion of the abutment.  We'll have her use Bactroban over the area and see her back in 2 weeks.      Marcia Rasheed MD

## 2018-04-18 NOTE — NURSING NOTE
"Chief Complaint   Patient presents with     RECHECK     post op     Height 1.47 m (4' 9.87\"), weight 68.9 kg (152 lb).    Ab Hamilton LPN    "

## 2018-05-30 ENCOUNTER — OFFICE VISIT (OUTPATIENT)
Dept: OTOLARYNGOLOGY | Facility: CLINIC | Age: 63
End: 2018-05-30
Payer: COMMERCIAL

## 2018-05-30 VITALS
WEIGHT: 154.5 LBS | HEIGHT: 58 IN | BODY MASS INDEX: 32.43 KG/M2 | SYSTOLIC BLOOD PRESSURE: 111 MMHG | HEART RATE: 77 BPM | DIASTOLIC BLOOD PRESSURE: 66 MMHG

## 2018-05-30 DIAGNOSIS — H90.A31 MIXED CONDUCTIVE AND SENSORINEURAL HEARING LOSS OF RIGHT EAR WITH RESTRICTED HEARING OF LEFT EAR: Primary | ICD-10-CM

## 2018-05-30 RX ORDER — ESTRADIOL 0.05 MG/D
PATCH TRANSDERMAL
COMMUNITY
Start: 2018-03-08

## 2018-05-30 RX ORDER — CITALOPRAM HYDROBROMIDE 20 MG/1
TABLET ORAL
COMMUNITY
Start: 2018-04-20

## 2018-05-30 RX ORDER — RIZATRIPTAN BENZOATE 10 MG/1
TABLET, ORALLY DISINTEGRATING ORAL
COMMUNITY
Start: 2018-04-20

## 2018-05-30 ASSESSMENT — PAIN SCALES - GENERAL: PAINLEVEL: NO PAIN (0)

## 2018-05-30 NOTE — Clinical Note
5/30/2018       RE: Rocio Forbes  [address] [address] [address]     Dear Colleague,    Thank you for referring your patient, Rocio Forbes, to the [facility] at [facility]. Please see a copy of my visit note below.    Neurotology Clinic Note  05/30/18    Rocio Forbes is seen today in follow-up for a right osseointegrated implant (Oticon device on 4/11/2018). She was last seen in clinic on 4/18/2018 and at that time she was noted to have a gap along the superior portion of the abutment and therefore we had her start Bactroban to the area and she returns today for follow-up. She reports that she only had to use the Bactroban for a short time before the open area closed up. She has had no pain or drainage from the site. She has not started to clean it yet.     Review of systems:    Patient Supplied Answers to Review of Systems  [facility] ROS 4/15/2018   Ears, Nose, Throat Hearing loss, Ringing/noise in ears     A full 10 point review of systems was conducted and all other systems negative unless mentioned above or in HPI.     Physical examination:  female in no acute distress.  Alert and answering questions appropriately.  HB 1/6 bilaterally. She is wearing bilateral hearing aids. The abutment is solid to shake test. There is only very mild erythema of surrounding skin, but no skin breakdown and area that was slightly open has closed off nicely.     Assessment and plan:  Rocio Forbes is seen today in follow-up for a right osseointegrated implant (Oticon device on 4/11/2018). The gap in the incision seen at the last visit has closed and the abutment is solid and surrounding skin is healthy in appearance. We instructed her that she can start cleaning around the abutment with a soft toothbrush. All questions were answered and she is in agreement with this plan. She will return for a nurse visit on 8/9/2018 for a site check. She can certainly come see us sooner  if any new or concerning issues arise.       Again, thank you for allowing me to participate in the care of your patient.      Sincerely,    Marcia Rasheed MD

## 2018-05-30 NOTE — LETTER
5/30/2018      RE: Rocio Forbes  [address] [address] [address]       Neurotology Clinic Note  05/30/18    Rocio Forbes is seen today in follow-up for a right osseointegrated implant (Oticon device on 4/11/2018). She was last seen in clinic on 4/18/2018 and at that time she was noted to have a gap along the superior portion of the abutment and therefore we had her start Bactroban to the area and she returns today for follow-up. She reports that she only had to use the Bactroban for a short time before the open area closed up. She has had no pain or drainage from the site. She has not started to brush it yet.     Physical examination:  female in no acute distress.  Alert and answering questions appropriately.  HB 1/6 bilaterally. She is wearing bilateral hearing aids. The abutment is solid to shake test. There is only very mild erythema of surrounding skin, but no skin breakdown and area that was slightly open has closed off nicely.     Assessment and plan:  Rocio Forbes is seen today in follow-up after right osseointegrated implant placement (Oticon device on 4/11/2018). The gap in the incision seen at the last visit has closed and the abutment is solid and surrounding skin is healthy in appearance. We instructed her that she can start brushing around the abutment with a soft toothbrush. All questions were answered and she is in agreement with this plan. She will return for a nurse visit on 8/9/2018 for a site check prior to processor loading. She can certainly come see us sooner if any new or concerning issues arise.     I, Marcia Rasheed, saw this patient with the resident/fellow and agree with the resident s findings and plan of care as documented in the resident s/fellow s note.     MD Marcia Smith MD

## 2018-05-30 NOTE — NURSING NOTE
"Chief Complaint   Patient presents with     Consult     post op consult.     Blood pressure 111/66, pulse 77, height 1.473 m (4' 10\"), weight 70.1 kg (154 lb 8 oz).    Travis Anderson\    "

## 2018-05-30 NOTE — MR AVS SNAPSHOT
After Visit Summary   5/30/2018    Rocio Forbes    MRN: [id number]           Patient Information     Date Of Birth          1955        Visit Information        Provider Department      5/30/2018 8:30 AM Marcia Rasheed MD [facility] Ear Nose and Throat        Care Instructions    You will need  to schedule a follow up appointment in 8-9-18 at 7:45 AM, for a nurse visit to check the BAHA site.   Please call our clinic for any questions,concerns,or worsening symptoms.      Clinic #[phone]       Option 3  for the triage nurse.          Follow-ups after your visit        Your next 10 appointments already scheduled     May 30, 2018  8:30 AM CDT   (Arrive by 8:15 AM)   Return Visit with MD MARY LOU [facility] Ear Nose and Throat ([facility])    [address] [place] [address] [phone]            Aug 09, 2018  8:00 AM CDT   BAHA Follow Up with Natali Muhammad   [facility] Audiology ([facility])    [address] [place] [address] [phone]              Who to contact     Please call your clinic at [phone] to:    Ask questions about your health    Make or cancel appointments    Discuss your medicines    Learn about your test results    Speak to your doctor            Additional Information About Your Visit        MyChart Information     [employer] gives you secure access to your electronic health record. If you see a primary care provider, you can also send messages to your care team and make appointments. If you have questions, please call your primary care clinic.  If you do not have a primary care provider, please call [phone] and they will assist you.      [employer] is an electronic gateway that provides easy, online access to your medical records. With [employer], you can request a clinic appointment, read your test results, renew a prescription or communicate with  "your care team.     To access your existing account, please contact your [facility] or call [phone] for assistance.        Care EveryWhere ID     This is your Care EveryWhere ID. This could be used by other organizations to access your [place] medical records  [id number]        Your Vitals Were     Pulse Height BMI (Body Mass Index)             77 1.473 m (4' 10\") 32.29 kg/m2          Blood Pressure from Last 3 Encounters:   05/30/18 111/66   04/11/18 101/42    Weight from Last 3 Encounters:   05/30/18 70.1 kg (154 lb 8 oz)   04/18/18 68.9 kg (152 lb)   04/11/18 68.5 kg (151 lb)              Today, you had the following     No orders found for display       Primary Care Provider Office Phone # Fax #    Tre Mace [phone]       [address]     [facility] [address]        Equal Access to Services     CHUCK SOL : Hadii aad ku hadasho Soomaali, waaxda luqadaha, qaybta kaalmada adeegyada, waxay idiin hayaan adeeg kharash la'mollyn . So [facility] [phone].    ATENCIÓN: Si habla español, tiene a reynoso disposición servicios gratuitos de asistencia lingüística. Melizaame al [phone].    We comply with applicable federal civil rights laws and [place] laws. We do not discriminate on the basis of race, color, national origin, age, disability, sex, sexual orientation, or gender identity.            Thank you!     Thank you for choosing [facility] EAR NOSE AND THROAT  for your care. Our goal is always to provide you with excellent care. Hearing back from our patients is one way we can continue to improve our services. Please take a few minutes to complete the written survey that you may receive in the mail after your visit with us. Thank you!             Your Updated Medication List - Protect others around you: Learn how to safely use, store and throw away your medicines at www.disposemymeds.org.          This list is accurate as of " 5/30/18  8:06 AM.  Always use your most recent med list.                   Brand Name Dispense Instructions for use Diagnosis    * citalopram 10 MG tablet    celeXA     20 mg daily        * citalopram 20 MG tablet    celeXA          * estradiol biweekly    VIVELLE-DOT          * estradiol 0.05 MG/24HR WK patch    CLIMARA          rizatriptan 10 MG ODT tab    MAXALT-MLT          * Notice:  This list has 4 medication(s) that are the same as other medications prescribed for you. Read the directions carefully, and ask your doctor or other care provider to review them with you.

## 2018-05-30 NOTE — PROGRESS NOTES
Neurotology Clinic Note  05/30/18    Rocio Forbes is seen today in follow-up for a right osseointegrated implant (Oticon device on 4/11/2018). She was last seen in clinic on 4/18/2018 and at that time she was noted to have a gap along the superior portion of the abutment and therefore we had her start Bactroban to the area and she returns today for follow-up. She reports that she only had to use the Bactroban for a short time before the open area closed up. She has had no pain or drainage from the site. She has not started to brush it yet.     Physical examination:  female in no acute distress.  Alert and answering questions appropriately.  HB 1/6 bilaterally. She is wearing bilateral hearing aids. The abutment is solid to shake test. There is only very mild erythema of surrounding skin, but no skin breakdown and area that was slightly open has closed off nicely.     Assessment and plan:  Rocio Forbes is seen today in follow-up after right osseointegrated implant placement (Oticon device on 4/11/2018). The gap in the incision seen at the last visit has closed and the abutment is solid and surrounding skin is healthy in appearance. We instructed her that she can start brushing around the abutment with a soft toothbrush. All questions were answered and she is in agreement with this plan. She will return for a nurse visit on 8/9/2018 for a site check prior to processor loading. She can certainly come see us sooner if any new or concerning issues arise.     I, Marcia Rasheed, saw this patient with the resident/fellow and agree with the resident s findings and plan of care as documented in the resident s/fellow s note.     Marcia Rasheed MD

## 2018-05-30 NOTE — PATIENT INSTRUCTIONS
You will need  to schedule a follow up appointment in 8-9-18 at 7:45 AM, for a nurse visit to check the BAHA site.  CLEANING THE BAHA SITE: use soap and water.  WATCH FOR/CALL THE CLINIC :Signs of infection- drainage, redness or pain.  CLEANING WITH A BRUSH: Clean the post with a brush twice a day-       Please call our clinic for any questions,concerns,or worsening symptoms.      Clinic #[phone]       Option 3  for the triage nurse.

## 2018-08-09 ENCOUNTER — OFFICE VISIT (OUTPATIENT)
Dept: AUDIOLOGY | Facility: CLINIC | Age: 63
End: 2018-08-09
Payer: COMMERCIAL

## 2018-08-09 ENCOUNTER — ALLIED HEALTH/NURSE VISIT (OUTPATIENT)
Dept: OTOLARYNGOLOGY | Facility: CLINIC | Age: 63
End: 2018-08-09
Payer: COMMERCIAL

## 2018-08-09 DIAGNOSIS — H90.A31 MIXED CONDUCTIVE AND SENSORINEURAL HEARING LOSS OF RIGHT EAR WITH RESTRICTED HEARING OF LEFT EAR: ICD-10-CM

## 2018-08-09 DIAGNOSIS — H90.A22 SENSORINEURAL HEARING LOSS (SNHL) OF LEFT EAR WITH RESTRICTED HEARING OF RIGHT EAR: Primary | ICD-10-CM

## 2018-08-09 DIAGNOSIS — H90.2 CONDUCTIVE HEARING LOSS: Primary | ICD-10-CM

## 2018-08-09 NOTE — PROGRESS NOTES
Rocio Forbes comes into clinic today at the request of Dr. Marcia Rasheed- for right BAHA check prior to device activation        This service provided today was under the supervising provider of the day Dr. Marcia Rasheed/Dr. Niall Grant, who was available if needed.      Tracie Riojas

## 2018-08-09 NOTE — NURSING NOTE
Right baha site, clean/dry/intact- no gap noted along the superior portion of the implant.  Implant is stable- not loose- to the skull/head. Pt denies any pain or drainage from site.   Pt will proceed with device activation with audiology.  Tracie Riojas RN  ENT Care Coordinator   Otology  [phone]  8/9/2018 8:38 AM

## 2018-08-09 NOTE — PROGRESS NOTES
"AUDIOLOGY REPORT    BACKGROUND INFORMATION: Rocio Forbes, 63 year old female, was seen in the Audiology Clinic at the [facility] on 8/9/2018. This appointment was for the fitting of a right [employer] 3 [employer] Power bone anchored processor to be used with an osseointegrated implant placed by Dr Marcia Rasheed on 4/11/2018.  Previous evaluations have revealed a left sensorineural hearing loss and right mixed hearing loss. The patient does not have hearing that is aidable by a traditional hearing aid due to previous ear surgeries. She has previously worn bilateral hearing aids.    TEST RESULTS AND PROCEDURES:  The surgical site was inspected and Tracie Riojas, an ENT nurse, checked the site prior to the audiology appointment today and gave clearance that the site has healed well.  A shake test indicated that good osseointegration had occurred.  A check of the screw indicated that it was tight.     The device was connected to the software and feedback test was performed.  In-situ bone conduction audiometry was performed and the device was programmed to those settings.  She reported that things were initially \"too loud\" and so overall gain was decreased roughly 4 dB and she reported better volume.  Rocio reported good sound quality with the device and felt that she was now hearing better in the right ear compared to the left ear.     A review of care and maintenance of the device was performed. Rocio practiced placing and removing the device easily in office and reported she felt comfortable doing that at home. The volume control was activated for her use.  The push button appeared to not be functioning correctly as the device would not mute.  A new device will be ordered and the device will be exchanged for one with a working push button.    The streamer was connected to the device and a brief tutorial was completed. An in-office test was performed to confirm that the " device, streamer, and phone were all connected. She reports she has used a streamer with her hearing aids in the past and so was comfortable with how it functions.    Due to time constraints (as patient was 20min late for the appointment), soundfield testing with the osseointegrated device could not be completed today.    SUMMARY AND RECOMMENDATIONS: Rocio Forbes was fit with a right bone anchored processor to be used with an osseointegrated implant for the purpose of stimulation of poorer hearing ear nerve stimulation through bone conduction. The patient will be contacted when the replacement device (with functioning push button) arrives to schedule a 30 minute appointment to exchange devices and to answer any questions or concerns that she may have. Call this clinic with questions regarding these results or recommendations.      Natali Muhammad  Audiologist  [place] License  #[id number]

## 2018-08-09 NOTE — MR AVS SNAPSHOT
After Visit Summary   8/9/2018    Rocio Forbes    MRN: [id number]           Patient Information     Date Of Birth          1955        Visit Information        Provider Department      8/9/2018 8:00 AM Char Monzon [facility] Audiology        Today's Diagnoses     Sensorineural hearing loss (SNHL) of left ear with restricted hearing of right ear    -  1    Mixed conductive and sensorineural hearing loss of right ear with restricted hearing of left ear           Follow-ups after your visit        Who to contact     Please call your clinic at [phone] to:    Ask questions about your health    Make or cancel appointments    Discuss your medicines    Learn about your test results    Speak to your doctor            Additional Information About Your Visit        [employer] Information     [employer] gives you secure access to your electronic health record. If you see a primary care provider, you can also send messages to your care team and make appointments. If you have questions, please call your primary care clinic.  If you do not have a primary care provider, please call [phone] and they will assist you.      [employer] is an electronic gateway that provides easy, online access to your medical records. With [employer], you can request a clinic appointment, read your test results, renew a prescription or communicate with your care team.     To access your existing account, please contact your [facility] or call [phone] for assistance.        Care EveryWhere ID     This is your Care EveryWhere ID. This could be used by other organizations to access your [place] medical records  [id number]         Blood Pressure from Last 3 Encounters:   05/30/18 111/66   04/11/18 101/42    Weight from Last 3 Encounters:   05/30/18 70.1 kg (154 lb 8 oz)   04/18/18 68.9 kg (152 lb)   04/11/18 68.5 kg (151 lb)              We Performed the Following     BAHA - Fit or F/U  ([id number])        Primary Care Provider Office Phone # Fax #    Tre Mace [phone]       [facility] [address]     [facility] [address]        Equal Access to Services     CHUCK SOL : Hadii charles ku hadzeeshano Soomaali, waaxda luqadaha, qaybta kaalmada adeegyada, waxyoung kamilain balwindern samiteagan guerreroshaista solano. So [facility] [phone].    ATENCIÓN: Si habla español, tiene a reynoso disposición servicios gratuitos de asistencia lingüística. Llame al [phone].    We comply with applicable federal civil rights laws and [place] laws. We do not discriminate on the basis of race, color, national origin, age, disability, sex, sexual orientation, or gender identity.            Thank you!     Thank you for choosing [facility] AUDIOLOGY  for your care. Our goal is always to provide you with excellent care. Hearing back from our patients is one way we can continue to improve our services. Please take a few minutes to complete the written survey that you may receive in the mail after your visit with us. Thank you!             Your Updated Medication List - Protect others around you: Learn how to safely use, store and throw away your medicines at www.disposemymeds.org.          This list is accurate as of 8/9/18  3:32 PM.  Always use your most recent med list.                   Brand Name Dispense Instructions for use Diagnosis    * citalopram 10 MG tablet    celeXA     20 mg daily        * citalopram 20 MG tablet    celeXA          * estradiol biweekly    VIVELLE-DOT          * estradiol 0.05 MG/24HR WK patch    CLIMARA          rizatriptan 10 MG ODT tab    MAXALT-MLT          * Notice:  This list has 4 medication(s) that are the same as other medications prescribed for you. Read the directions carefully, and ask your doctor or other care provider to review them with you.

## 2018-08-09 NOTE — MR AVS SNAPSHOT
After Visit Summary   8/9/2018    Rocio Forbes    MRN: [id number]           Patient Information     Date Of Birth          1955        Visit Information        Provider Department      8/9/2018 7:45 AM NurseTristen Ent [facility] Ear Nose and Throat        Today's Diagnoses     Conductive hearing loss    -  1       Follow-ups after your visit        Who to contact     Please call your clinic at [phone] to:    Ask questions about your health    Make or cancel appointments    Discuss your medicines    Learn about your test results    Speak to your doctor            Additional Information About Your Visit        MyChart Information     [employer] gives you secure access to your electronic health record. If you see a primary care provider, you can also send messages to your care team and make appointments. If you have questions, please call your primary care clinic.  If you do not have a primary care provider, please call [phone] and they will assist you.      [employer] is an electronic gateway that provides easy, online access to your medical records. With [employer], you can request a clinic appointment, read your test results, renew a prescription or communicate with your care team.     To access your existing account, please contact your [facility] or call [phone] for assistance.        Care EveryWhere ID     This is your Care EveryWhere ID. This could be used by other organizations to access your [place] medical records  [id number]         Blood Pressure from Last 3 Encounters:   05/30/18 111/66   04/11/18 101/42    Weight from Last 3 Encounters:   05/30/18 70.1 kg (154 lb 8 oz)   04/18/18 68.9 kg (152 lb)   04/11/18 68.5 kg (151 lb)              Today, you had the following     No orders found for display       Primary Care Provider Office Phone # Fax #    Tre Mace [phone]       [facility] [address] [facility]       [facility] [address]        Equal Access to Services     [facility] JoseKRIST[facility] : Hadii charles ku ada Soileana, wajarvisda luqadaha, qaflorentinota kaalmada samigeovani, waxyoung kamilain hayaameaghan yograceshaista solano. So [facility] [phone].    ATENCIÓN: Si habla español, tiene a reynoso disposición servicios gratuitos de asistencia lingüística. [place] al [phone].    We comply with applicable federal civil rights laws and [place] laws. We do not discriminate on the basis of race, color, national origin, age, disability, sex, sexual orientation, or gender identity.            Thank you!     Thank you for choosing [facility] EAR NOSE AND THROAT  for your care. Our goal is always to provide you with excellent care. Hearing back from our patients is one way we can continue to improve our services. Please take a few minutes to complete the written survey that you may receive in the mail after your visit with us. Thank you!             Your Updated Medication List - Protect others around you: Learn how to safely use, store and throw away your medicines at www.disposemymeds.org.          This list is accurate as of 8/9/18  8:43 AM.  Always use your most recent med list.                   Brand Name Dispense Instructions for use Diagnosis    * citalopram 10 MG tablet    celeXA     20 mg daily        * citalopram 20 MG tablet    celeXA          * estradiol biweekly    VIVELLE-DOT          * estradiol 0.05 MG/24HR WK patch    CLIMARA          rizatriptan 10 MG ODT tab    MAXALT-MLT          * Notice:  This list has 4 medication(s) that are the same as other medications prescribed for you. Read the directions carefully, and ask your doctor or other care provider to review them with you.

## 2018-09-28 ENCOUNTER — OFFICE VISIT (OUTPATIENT)
Dept: AUDIOLOGY | Facility: CLINIC | Age: 63
End: 2018-09-28
Payer: COMMERCIAL

## 2018-09-28 DIAGNOSIS — H90.A22 SENSORINEURAL HEARING LOSS (SNHL) OF LEFT EAR WITH RESTRICTED HEARING OF RIGHT EAR: ICD-10-CM

## 2018-09-28 DIAGNOSIS — H90.A31 MIXED CONDUCTIVE AND SENSORINEURAL HEARING LOSS OF RIGHT EAR WITH RESTRICTED HEARING OF LEFT EAR: Primary | ICD-10-CM

## 2018-09-28 NOTE — MR AVS SNAPSHOT
After Visit Summary   9/28/2018    Rocio Forbes    MRN: [id number]           Patient Information     Date Of Birth          1955        Visit Information        Provider Department      9/28/2018 12:00 PM Char Monzon AuD M [facility]        Today's Diagnoses     Mixed conductive and sensorineural hearing loss of right ear with restricted hearing of left ear    -  1    Sensorineural hearing loss (SNHL) of left ear with restricted hearing of right ear           Follow-ups after your visit        Your next 10 appointments already scheduled     Sep 28, 2018 12:00 PM CDT   BAHA Follow Up with Natali Muhammad [facility] ([facility] [place])    [address]  [address] [facility] [address] [phone]              Who to contact     Please call your clinic at [phone] to:    Ask questions about your health    Make or cancel appointments    Discuss your medicines    Learn about your test results    Speak to your doctor            Additional Information About Your Visit        AudioName[facility]Aethon Information     [facility] gives you secure access to your electronic health record. If you see a primary care provider, you can also send messages to your care team and make appointments. If you have questions, please call your primary care clinic.  If you do not have a primary care provider, please call [phone] and they will assist you.      [facility] is an electronic gateway that provides easy, online access to your medical records. With [facility], you can request a clinic appointment, read your test results, renew a prescription or communicate with your care team.     To access your existing account, please contact your [facility] or call [phone] for assistance.        Care EveryWhere ID     This is your Care EveryWhere ID. This could be used by other organizations to access your [facility]  records  [id number]         Blood Pressure from Last 3 Encounters:   05/30/18 111/66   04/11/18 101/42    Weight from Last 3 Encounters:   05/30/18 70.1 kg (154 lb 8 oz)   04/18/18 68.9 kg (152 lb)   04/11/18 68.5 kg (151 lb)              We Performed the Following     No Charge, Hearing Aid Clinic Visit        Primary Care Provider Office Phone # Fax #    Tre Mace [phone]       [facility] [address]     [facility] [address]        Equal Access to Services     SUPRIYA SOL : Hadii aad ku hadasho Soomaali, waaxda luqadaha, qaybta kaalmada adeegyada, waxay idiin hayaan adeeg khmomo raymond . So [facility] [phone].    ATENCIÓN: Si habla español, tiene a reynoso disposición servicios gratuitos de asistencia lingüística. Llame al [phone].    We comply with applicable federal civil rights laws and [place] laws. We do not discriminate on the basis of race, color, national origin, age, disability, sex, sexual orientation, or gender identity.            Thank you!     Thank you for choosing [facility]UD[facility]  for your care. Our goal is always to provide you with excellent care. Hearing back from our patients is one way we can continue to improve our services. Please take a few minutes to complete the written survey that you may receive in the mail after your visit with us. Thank you!             Your Updated Medication List - Protect others around you: Learn how to safely use, store and throw away your medicines at www.disposemymeds.org.          This list is accurate as of 9/28/18 11:56 AM.  Always use your most recent med list.                   Brand Name Dispense Instructions for use Diagnosis    * citalopram 10 MG tablet    celeXA     20 mg daily        * citalopram 20 MG tablet    celeXA          * estradiol biweekly    VIVELLE-DOT          * estradiol 0.05 MG/24HR WK patch    CLIMARA          rizatriptan 10 MG ODT tab    MAXALT-MLT          * Notice:  This list has  4 medication(s) that are the same as other medications prescribed for you. Read the directions carefully, and ask your doctor or other care provider to review them with you.

## 2018-09-28 NOTE — PROGRESS NOTES
"AUDIOLOGY REPORT    BACKGROUND INFORMATION: Rocio Forbes was seen in the Audiology Clinic at the [facility] on 9/28/2018 for osseointegrated device follow-up.  The patient has been seen previously in this clinic on 11/13/2017 and results revealed sensorineural hearing loss left and mixed hearing loss right.  The patient was fit with a right O[employer] Ponto 3 SP on 8/9/2018.  The patient reports that she loves the device and does not want any changes made to the programming.    TEST RESULTS AND PROCEDURES: The abutment site was checked and appeared healthy with no debris or swelling noted. The previous device was returned and she was given the new device with a working push button. No changes made to the programming and patient is happy.    SUMMARY AND RECOMMENDATIONS: An osseointegrated device appointment was performed today and Rocio picked up the device with the working push button. The patient will return for follow-up as needed regarding the device. Today's appointment is a \"no-charge\" visit as no billable service was provided, devices were simply swapped out. Call this clinic with questions regarding today s visit.      Natali Muhammad  Audiologist  [place] License  #[id number]                        "

## 2019-04-25 ENCOUNTER — DOCUMENTATION ONLY (OUTPATIENT)
Dept: CARE COORDINATION | Facility: CLINIC | Age: 64
End: 2019-04-25

## 2019-11-04 ENCOUNTER — HEALTH MAINTENANCE LETTER (OUTPATIENT)
Age: 64
End: 2019-11-04

## 2020-02-11 ENCOUNTER — TELEPHONE (OUTPATIENT)
Dept: OTOLARYNGOLOGY | Facility: CLINIC | Age: 65
End: 2020-02-11

## 2020-02-11 NOTE — TELEPHONE ENCOUNTER
Health Call Center    Phone Message    May a detailed message be left on voicemail: yes     Reason for Call: Other: Patient want to be seen for a ear cleaning, she is a patient of Bakari Rasheed's. Patient does not want to be seen for Rasheed's next avaliable she want a sooner appointment with a different provider please call to discuss.     Action Taken: Other: p ENT    Travel Screening: Not Applicable

## 2020-02-12 NOTE — TELEPHONE ENCOUNTER
Per Leda Jiménez, EMT: Patient can be scheduled for next available P NEW with Dr. Grant or Eula Marie PA-C for ear cleaning.    Called patient and left a VM explaining that she could see Dr. Grant or Eula. Provided ENT call center number for patient to call back and schedule.

## 2020-02-13 ENCOUNTER — DOCUMENTATION ONLY (OUTPATIENT)
Dept: CARE COORDINATION | Facility: CLINIC | Age: 65
End: 2020-02-13

## 2020-03-03 ENCOUNTER — OFFICE VISIT (OUTPATIENT)
Dept: OTOLARYNGOLOGY | Facility: CLINIC | Age: 65
End: 2020-03-03
Payer: COMMERCIAL

## 2020-03-03 DIAGNOSIS — H90.71 MIXED CONDUCTIVE AND SENSORINEURAL HEARING LOSS OF RIGHT EAR, UNSPECIFIED HEARING STATUS ON CONTRALATERAL SIDE: ICD-10-CM

## 2020-03-03 DIAGNOSIS — H61.22 IMPACTED CERUMEN OF LEFT EAR: ICD-10-CM

## 2020-03-03 DIAGNOSIS — H90.5 SENSORINEURAL HEARING LOSS (SNHL) OF LEFT EAR, UNSPECIFIED HEARING STATUS ON CONTRALATERAL SIDE: ICD-10-CM

## 2020-03-03 DIAGNOSIS — H95.191 ENCOUNTER FOR MASTOIDECTOMY CAVITY DEBRIDEMENT, RIGHT: Primary | ICD-10-CM

## 2020-03-03 ASSESSMENT — PAIN SCALES - GENERAL: PAINLEVEL: NO PAIN (0)

## 2020-03-03 NOTE — LETTER
3/3/2020       RE: Rocio Forbes  [address] [address] [address]     Dear Colleague,    Thank you for referring your patient, Rocio Forbes, to the [facility] at [facility]. Please see a copy of my visit note below.    Dear Tre Crenshaw:    I had the pleasure of seeing Rocio Forbes in followup today at the [facility].    CHIEF COMPLAINT: Right mastoid cavity    HISTORY OF PRESENT ILLNESS: Patient is a 64-year-old in today for follow-up from her last visit 5/30/2018.  She has had surgery in the right ear for cholesteatoma in 1969, a canal wall down surgical procedure.  In the 2000 she had an attempt at ossicular reconstruction which was unsuccessful, underwent a right Baha 4/11/2018 which has helped with the hearing on the right side.  She has a left sensorineural hearing loss and wears a hearing aid for that.  She comes him yearly for mastoid bowl cleaning.  On her follow-up today, she has not had any pain or drainage in either ear.  Both sides do feel full and plugged, has had cerumen buildup on the left in the past as well.  She denies any dizziness.  She has constant bilateral tinnitus but not problematic.  She denies any dysphasia, hoarseness, facial paresthesias.    MEDICATIONS: Please refer to the detailed medication reconciliation performed by my nurse today, which I have reviewed and signed.     ALLERGIES:    Allergies   Allergen Reactions     Soap Dermatitis       HABITS: Social History    Substance and Sexual Activity      Alcohol use: Yes        Comment: 1-2 drinks per week     History   Smoking Status     Never Smoker   Smokeless Tobacco     Never Used         PAST MEDICAL HISTORY:  Please see today's intake form (for the remainder of the PMH) which I reviewed and signed.  Past Medical History:   Diagnosis Date     Depressive disorder 2004     Hearing problem 1969     Recurrent otitis media 1969        FAMILY HISTORY/SOCIAL HISTORY:    Family History   Problem Relation Age of Onset     Diabetes Father       Social History     Socioeconomic History     Marital status: Single     Spouse name: Not on file     Number of children: Not on file     Years of education: Not on file     Highest education level: Not on file   Occupational History     Not on file   Social Needs     Financial resource strain: Not on file     Food insecurity:     Worry: Not on file     Inability: Not on file     Transportation needs:     Medical: Not on file     Non-medical: Not on file   Tobacco Use     Smoking status: Never Smoker     Smokeless tobacco: Never Used   Substance and Sexual Activity     Alcohol use: Yes     Comment: 1-2 drinks per week     Drug use: No     Sexual activity: Yes     Partners: Male     Birth control/protection: Female Surgical   Lifestyle     Physical activity:     Days per week: Not on file     Minutes per session: Not on file     Stress: Not on file   Relationships     Social connections:     Talks on phone: Not on file     Gets together: Not on file     Attends Jew service: Not on file     Active member of club or organization: Not on file     Attends meetings of clubs or organizations: Not on file     Relationship status: Not on file     Intimate partner violence:     Fear of current or ex partner: Not on file     Emotionally abused: Not on file     Physically abused: Not on file     Forced sexual activity: Not on file   Other Topics Concern     Not on file   Social History Narrative     Not on file       REVIEW OF SYSTEMS: Patient Supplied Answers to Review of Systems   ENT ROS 2/29/2020   Ears, Nose, Throat Nasal congestion or drainage            The remainder of the 10 point ROS is negative    PHYSICIAL EXAMINATION:  Constitutional: The patient was well-groomed and in no acute distress.   Skin: Warm and pink.  Psychiatric: The patient's affect was calm, cooperative, and appropriate.    Respiratory: Breathing comfortably without stridor or exertion of accessory muscles.  Eyes: Pupils were equal and reactive. Extraocular movement intact.   Head: Normocephalic and atraumatic. No lesions or scars.  Ears: Both ears examined and she has considerable cerumen in both ears.  The right side initially was examined under the microscope with nasal speculum.  Impacted cerumen within the mastoid bowl was mobilized with the right angled hook and removed with alligator forceps.  Cleaning extended to the external and TM area, again cleaned with similar instruments.  Following cleaning, the mastoid bowl looks good  Stable.  Tympanic area shows sclerotic TM but no worrisome changes or evidence for cholesteatoma.  The left ear was cleaned under the microscope with similar instruments.  Following cleaning, tympanic membrane and middle ear look normal.  Nose: Sinuses were nontender. Anterior rhinoscopy revealed midline septum and absence of purulence or polyps.  Oral Cavity: Normal tongue, floor of mouth, buccal mucosa, and palate. No abnormal lymph tissue in the oropharynx.   Neck: The parotid is soft without masses. Supple with normal laryngeal and tracheal landmarks.   Lymphatic: There is no palpable lymphadenopathy or other masses in the neck.   Neurologic: Alert and oriented x 3. Cranial nerves III-XI within normal limits. Voice quality normal.  Cerebellar Function Tests:  Grossly normal    Baha: Implant looks solidly fixed and is clean, she cleans daily with toothbrush and area looks clean and well-healed.    IMPRESSION AND PLAN:   1. Encounter for right mastoid cavity cleaning: Cavity looks stable and well-healed, no further treatment needed, recommend yearly follow-up for cleanings, sooner if any concerns or problems.  2. Left impacted cerumen: Cleaned today, normal appearance afterwards, no further treatment needed, monitor.  3. Right mixed hearing loss: Continue with Baha, no further treatment needed,  monitor.  4. Left sensorineural hearing loss: Continue with hearing aids, no further treatment needed, monitor.  5. Bilateral tinnitus: Continue with hearing aid usage to mass, no further treatment needed, monitor.    Thank you very much for the opportunity to participate in the care of your patient.    Rick L Nissen MD

## 2020-03-03 NOTE — PROGRESS NOTES
Dear Tre Crenshaw:    I had the pleasure of seeing Rocio Forbes in followup today at the [facility].    CHIEF COMPLAINT: Right mastoid cavity    HISTORY OF PRESENT ILLNESS: Patient is a 64-year-old in today for follow-up from her last visit 5/30/2018.  She has had surgery in the right ear for cholesteatoma in 1969, a canal wall down surgical procedure.  In the 2000 she had an attempt at ossicular reconstruction which was unsuccessful, underwent a right Baha 4/11/2018 which has helped with the hearing on the right side.  She has a left sensorineural hearing loss and wears a hearing aid for that.  She comes him yearly for mastoid bowl cleaning.  On her follow-up today, she has not had any pain or drainage in either ear.  Both sides do feel full and plugged, has had cerumen buildup on the left in the past as well.  She denies any dizziness.  She has constant bilateral tinnitus but not problematic.  She denies any dysphasia, hoarseness, facial paresthesias.    MEDICATIONS: Please refer to the detailed medication reconciliation performed by my nurse today, which I have reviewed and signed.     ALLERGIES:    Allergies   Allergen Reactions     Soap Dermatitis       HABITS: Social History    Substance and Sexual Activity      Alcohol use: Yes        Comment: 1-2 drinks per week     History   Smoking Status     Never Smoker   Smokeless Tobacco     Never Used         PAST MEDICAL HISTORY:  Please see today's intake form (for the remainder of the PMH) which I reviewed and signed.  Past Medical History:   Diagnosis Date     Depressive disorder 2004     Hearing problem 1969     Recurrent otitis media 1969       FAMILY HISTORY/SOCIAL HISTORY:    Family History   Problem Relation Age of Onset     Diabetes Father       Social History     Socioeconomic History     Marital status: Single     Spouse name: Not on file     Number of children: Not on file     Years of education: Not on file      Highest education level: Not on file   Occupational History     Not on file   Social Needs     Financial resource strain: Not on file     Food insecurity:     Worry: Not on file     Inability: Not on file     Transportation needs:     Medical: Not on file     Non-medical: Not on file   Tobacco Use     Smoking status: Never Smoker     Smokeless tobacco: Never Used   Substance and Sexual Activity     Alcohol use: Yes     Comment: 1-2 drinks per week     Drug use: No     Sexual activity: Yes     Partners: Male     Birth control/protection: Female Surgical   Lifestyle     Physical activity:     Days per week: Not on file     Minutes per session: Not on file     Stress: Not on file   Relationships     Social connections:     Talks on phone: Not on file     Gets together: Not on file     Attends Restoration service: Not on file     Active member of club or organization: Not on file     Attends meetings of clubs or organizations: Not on file     Relationship status: Not on file     Intimate partner violence:     Fear of current or ex partner: Not on file     Emotionally abused: Not on file     Physically abused: Not on file     Forced sexual activity: Not on file   Other Topics Concern     Not on file   Social History Narrative     Not on file       REVIEW OF SYSTEMS: Patient Supplied Answers to Review of Systems   ENT ROS 2/29/2020   Ears, Nose, Throat Nasal congestion or drainage            The remainder of the 10 point ROS is negative    PHYSICIAL EXAMINATION:  Constitutional: The patient was well-groomed and in no acute distress.   Skin: Warm and pink.  Psychiatric: The patient's affect was calm, cooperative, and appropriate.   Respiratory: Breathing comfortably without stridor or exertion of accessory muscles.  Eyes: Pupils were equal and reactive. Extraocular movement intact.   Head: Normocephalic and atraumatic. No lesions or scars.  Ears: Both ears examined and she has considerable cerumen in both ears.  The right  side initially was examined under the microscope with nasal speculum.  Impacted cerumen within the mastoid bowl was mobilized with the right angled hook and removed with alligator forceps.  Cleaning extended to the external and TM area, again cleaned with similar instruments.  Following cleaning, the mastoid bowl looks good  Stable.  Tympanic area shows sclerotic TM but no worrisome changes or evidence for cholesteatoma.  The left ear was cleaned under the microscope with similar instruments.  Following cleaning, tympanic membrane and middle ear look normal.  Nose: Sinuses were nontender. Anterior rhinoscopy revealed midline septum and absence of purulence or polyps.  Oral Cavity: Normal tongue, floor of mouth, buccal mucosa, and palate. No abnormal lymph tissue in the oropharynx.   Neck: The parotid is soft without masses. Supple with normal laryngeal and tracheal landmarks.   Lymphatic: There is no palpable lymphadenopathy or other masses in the neck.   Neurologic: Alert and oriented x 3. Cranial nerves III-XI within normal limits. Voice quality normal.  Cerebellar Function Tests:  Grossly normal    Baha: Implant looks solidly fixed and is clean, she cleans daily with toothbrush and area looks clean and well-healed.    IMPRESSION AND PLAN:   1. Encounter for right mastoid cavity cleaning: Cavity looks stable and well-healed, no further treatment needed, recommend yearly follow-up for cleanings, sooner if any concerns or problems.  2. Left impacted cerumen: Cleaned today, normal appearance afterwards, no further treatment needed, monitor.  3. Right mixed hearing loss: Continue with Baha, no further treatment needed, monitor.  4. Left sensorineural hearing loss: Continue with hearing aids, no further treatment needed, monitor.  5. Bilateral tinnitus: Continue with hearing aid usage to mass, no further treatment needed, monitor.    Thank you very much for the opportunity to participate in the care of your  patient.    Rick L Nissen MD

## 2020-10-29 ENCOUNTER — TELEPHONE (OUTPATIENT)
Dept: AUDIOLOGY | Facility: CLINIC | Age: 65
End: 2020-10-29

## 2020-10-29 NOTE — TELEPHONE ENCOUNTER
[facility] Call Center    Phone Message    May a detailed message be left on voicemail: yes     Reason for Call: Other: Pt called in to request a replacement amplifier for her BAHA. Please reach out to her at [phone] to discuss. Thank you    Action Taken: Message routed to:  [facility] Audiology    Travel Screening: Not Applicable

## 2020-11-02 ENCOUNTER — TELEPHONE (OUTPATIENT)
Dept: AUDIOLOGY | Facility: CLINIC | Age: 65
End: 2020-11-02

## 2020-11-02 NOTE — TELEPHONE ENCOUNTER
Spoke with patient and she reports she lost her hearing device. The warranty just  for loss and damage 10/21/2020 and so a request was sent to [employer] to see if they would extend the warranty so she could have it replaced. If not she will need to go through the process of obtaining a new one.  Will contact patient again once [employer] responds.      Natali Muhammad  Audiologist  [place] License  #[id number]

## 2020-11-06 NOTE — TELEPHONE ENCOUNTER
Spoke with patient today. Let her know that the company extended the loss and damage warranty for her so we won't need to go through any prior authorizations to obtain a new one.  New BAHA device is on its way to the clinic and she will be contacted by the schedulers to come in for an updated hearing test and have the replacement device programmed. She expressed understanding and was in agreement with the plan.      Natali Muhammad  Audiologist  [place] License  #[id number]

## 2020-11-16 ENCOUNTER — HEALTH MAINTENANCE LETTER (OUTPATIENT)
Age: 65
End: 2020-11-16

## 2020-12-14 ENCOUNTER — OFFICE VISIT (OUTPATIENT)
Dept: AUDIOLOGY | Facility: CLINIC | Age: 65
End: 2020-12-14
Payer: COMMERCIAL

## 2020-12-14 DIAGNOSIS — H90.A31 MIXED CONDUCTIVE AND SENSORINEURAL HEARING LOSS OF RIGHT EAR WITH RESTRICTED HEARING OF LEFT EAR: Primary | ICD-10-CM

## 2020-12-14 DIAGNOSIS — H90.A22 SENSORINEURAL HEARING LOSS (SNHL) OF LEFT EAR WITH RESTRICTED HEARING OF RIGHT EAR: ICD-10-CM

## 2020-12-14 PROCEDURE — 92550 TYMPANOMETRY & REFLEX THRESH: CPT | Performed by: AUDIOLOGIST

## 2020-12-14 PROCEDURE — 92557 COMPREHENSIVE HEARING TEST: CPT | Performed by: AUDIOLOGIST

## 2020-12-14 NOTE — PROGRESS NOTES
AUDIOLOGY REPORT    SUBJECTIVE:  Rocio Forbes is a 65 year old female who was seen in the Audiology Clinic at the [facility] for audiologic evaluation and to  her replacement O[employer]on Medical Ponto 3SP. She uses a left hearing aid as well that was fit at an outside clinic. The patient has been seen previously in this clinic on 11/13/2017 for assessment and results indicated a left sensorineural hearing loss and right mixed hearing loss. Her history is significant for multiple right middle ear surgeries.  She reports right tinnitus that has been stable. The patient denies  bilateral otalgia, bilateral drainage, bilateral aural fullness, and dizziness.  The patient notes difficulty with communication in a variety of listening situations.     Another audiologist, Dr. Shirley Grace was present for today's appointment as assisted with testing.    OBJECTIVE:  Abuse Screening:  Do you feel unsafe at home or work/school? No  Do you feel threatened by someone? No  Does anyone try to keep you from having contact with others, or doing things outside of your home? No  Physical signs of abuse present? No     Fall Risk Screen:  1. Have you fallen two or more times in the past year? No  2. Have you fallen and had an injury in the past year? No    Timed Up and Go Score (in seconds): not tested  Is patient a fall risk? No  Referral initiated: No  Fall Risk Assessment Completed by Audiology    Otoscopic exam indicates ears are clear of cerumen bilaterally     Pure Tone Thresholds assessed using conventional audiometry with good  reliability from 250-8000 Hz bilaterally using insert earphones and circumaural headphones     RIGHT:  moderate-severe sloping to severe mixed hearing loss    LEFT:    normal sloping to moderate sensorineural hearing loss    Tympanogram:    RIGHT: flat tracing with large ear canal volume    LEFT:   hypermobile    Reflexes (reported by stimulus  ear):  RIGHT: Ipsilateral is absent at frequencies tested  RIGHT: Contralateral is absent at frequencies tested  LEFT:   Ipsilateral is present at normal levels  LEFT:   Contralateral is absent at frequencies tested    Speech Reception Threshold:    RIGHT: 80 dB HL    LEFT:   20 dB HL    Word Recognition Score:     RIGHT: 84% at 100 dB HL using NU-6 recorded word list.    LEFT:   76% at 80 dB HL using NU-6 recorded word list.    The replacement Oticon Medical Ponto 3 SP was programmed to the most recent settings. The patient reported good volume and sound quality and so no additional programming changes were made today. Patient will not be billed for the programming of the device as no adjustments were made. She will return should she have any problems with the sound quality.      ASSESSMENT:   Compared to patient's previous audiogram dated 11/13/2017, hearing has remained essentially stable in the left ear (some 10 dB changes were noted) and air-conduction threshold at 4 kHz decreased in the right ear. Patient picked up her replacement O[employer]on Medical Ponto 3 SP device. Today s results were discussed with the patient in detail.     PLAN:  Patient was counseled regarding hearing loss and impact on communication.  Patient is a good candidate for amplification at this time. It is recommended that the patient continue to monitor her hearing status every 2-3 years, sooner if concerns.  Patient is welcome to return for BAHA programming as needed.  Please call this clinic with questions regarding these results or recommendations.        Char Monzon, Natali  Audiologist  [place] License  #[id number]

## 2021-09-18 ENCOUNTER — HEALTH MAINTENANCE LETTER (OUTPATIENT)
Age: 66
End: 2021-09-18

## 2022-01-08 ENCOUNTER — HEALTH MAINTENANCE LETTER (OUTPATIENT)
Age: 67
End: 2022-01-08

## 2022-08-20 ENCOUNTER — HEALTH MAINTENANCE LETTER (OUTPATIENT)
Age: 67
End: 2022-08-20

## 2022-11-20 ENCOUNTER — HEALTH MAINTENANCE LETTER (OUTPATIENT)
Age: 67
End: 2022-11-20

## 2022-12-24 ENCOUNTER — HEALTH MAINTENANCE LETTER (OUTPATIENT)
Age: 67
End: 2022-12-24

## 2023-04-26 DIAGNOSIS — H90.A11 CONDUCTIVE HEARING LOSS OF RIGHT EAR WITH RESTRICTED HEARING OF LEFT EAR: Primary | ICD-10-CM

## 2023-04-27 ENCOUNTER — OFFICE VISIT (OUTPATIENT)
Dept: AUDIOLOGY | Facility: CLINIC | Age: 68
End: 2023-04-27
Payer: COMMERCIAL

## 2023-04-27 DIAGNOSIS — H90.A22 SENSORINEURAL HEARING LOSS (SNHL) OF LEFT EAR WITH RESTRICTED HEARING OF RIGHT EAR: ICD-10-CM

## 2023-04-27 DIAGNOSIS — H90.A31 MIXED CONDUCTIVE AND SENSORINEURAL HEARING LOSS OF RIGHT EAR WITH RESTRICTED HEARING OF LEFT EAR: Primary | ICD-10-CM

## 2023-04-27 PROCEDURE — 92550 TYMPANOMETRY & REFLEX THRESH: CPT | Performed by: AUDIOLOGIST

## 2023-04-27 PROCEDURE — 92557 COMPREHENSIVE HEARING TEST: CPT | Performed by: AUDIOLOGIST

## 2023-04-27 PROCEDURE — 92590 PR HEARING AID EXAM MONAURAL: CPT | Mod: RT | Performed by: AUDIOLOGIST

## 2023-04-27 NOTE — PROGRESS NOTES
AUDIOLOGY REPORT    SUBJECTIVE: Roico Forbes is a 67 year old female who was seen in the Audiology Clinic at the [facility] [place], on April 27, 2023 for an osseointegrated hearing implant consultation. The patient was seen in this clinic for a hearing evaluation directly proceeding this appointment and results indicated normal hearing sloping to a moderate sensorineural hearing loss in the left ear and a severe to profound mixed hearing loss in the right ear. Her history is significant for multiple right middle ear surgeries. The patient was implanted with a right osseointegrated implant by Dr. Rasheed on 4/11/18. She was fit with a right O[employer]on Medical Ponto 3 SP on 8/9/2018. She was fit with a replacement device obtained via loss and damage warranty on 12/14/20. Rocio utilizes an Oticon -in-the ear hearing fit at an outside clinic in her left ear. Today Rocio reports she has lost her BAHA device and is hoping to get a new one. In the meantime, she is utilizing binaural -in-the-ear hearing aids.     OBJECTIVE: A consultation was conducted in which Rocio was presented with osseointegrated device options. She reports that she was very happy with previous device. Discussed that we can no longer get a replacement Oticon Ponto 3 SP as it is not available to order anymore, but could look at trying to get an upgrade to the Oticon Ponto 5 SP. Patient is in agreement with this. Discussed that patient would work with [employer] to obtain a new device through insurance. Upgrade paperwork was completed with patient today and will be sent to [employer]. Briefly discussed discounted self pay option with company if insurance will not cover device.     The system mutually chosen was:     Right: Oticon Medical Ponto 5 SP   COLOR:Talisheek Brown (93)   BATTERY SIZE: 675    ACCESSORY CHOSEN: Connect Clip     ASSESSMENT: An osseointegrated hearing implant consultation for an  upgrade device was conducted today. Paperwork was completed with patient and will be sent to [employer].       PLAN: Rocio will be contacted when update is received. If device is approved, patient will return for fitting. Please contact this clinic with any questions or concerns.      Kellen Patricia. CCC-A  Licensed Audiologist   [place] #[id number]

## 2023-04-27 NOTE — PROGRESS NOTES
AUDIOLOGY REPORT    SUMMARY: Audiology visit completed. See audiogram for results.      RECOMMENDATIONS: Follow-up with ENT.    Berta Munoz, CCC-A  Clinical Audiologist  [place] #[id number]

## 2023-06-04 ENCOUNTER — MYC MEDICAL ADVICE (OUTPATIENT)
Dept: AUDIOLOGY | Facility: CLINIC | Age: 68
End: 2023-06-04
Payer: COMMERCIAL

## 2023-06-22 ENCOUNTER — OFFICE VISIT (OUTPATIENT)
Dept: AUDIOLOGY | Facility: CLINIC | Age: 68
End: 2023-06-22
Payer: COMMERCIAL

## 2023-06-22 DIAGNOSIS — H90.A22 SENSORINEURAL HEARING LOSS (SNHL) OF LEFT EAR WITH RESTRICTED HEARING OF RIGHT EAR: ICD-10-CM

## 2023-06-22 DIAGNOSIS — H90.A31 MIXED CONDUCTIVE AND SENSORINEURAL HEARING LOSS OF RIGHT EAR WITH RESTRICTED HEARING OF LEFT EAR: Primary | ICD-10-CM

## 2023-06-22 PROCEDURE — 92700 UNLISTED ORL SERVICE/PX: CPT | Performed by: AUDIOLOGIST

## 2023-06-26 NOTE — PROGRESS NOTES
AUDIOLOGY REPORT    SUBJECTIVE: Rocio Forbes is a 68 year old female who was seen in the Audiology Clinic at the [facility] for a fitting of right Oticon Ponto 5 SP upgrade device. Previous results have revealed a normal hearing sloping to a moderate sensorineural hearing loss in the left ear and a severe to profound mixed hearing loss in the right ear. Her history is significant for multiple right middle ear surgeries. The patient was implanted with a right osseointegrated implant by Dr. Rasheed on 4/11/18.  She lost her previous device and obtained a new one via upgrade. She returns today to be fit with this.     OBJECTIVE: Area around abutment did not appear red and no drainage was noted. Abutment was found to be securely in place.  Device was placed and found to be snug fit. Feedback test and in-situ measurements were completed. Rocio was oriented to proper hearing aid use, care, cleaning (no water, dry brush), batteries (size: 675, insertion/removal, toxicity, low-battery signal), aid insertion/removal, user booklet, warranty information, storage cases, and other hearing aid details. The patient confirmed understanding of hearing aid use and care, and showed proper insertion of hearing aid and batteries while in the office today.Rocio reported good volume and sound quality today.   Hearing aids were programmed as follows:  Program 1:Everyday   Program 2: Speech in Noise  Program 3: Music  Program 4: Comfort  Push button is activated for volume change, program change, and mute    EAR(S) FIT: Right  HEARING AID MODEL NAME: Oticon Medical Ponto 5 SP  HEARING AID STYLE: BAHA  EARMOLDS/TIP: N/A  SERIAL NUMBERS: Right: [id number] Left: N/A  WARRANTY END DATE: 7/31/2025  ACCESSORY: Connect Clip (SN:[id number])    The processor was successfully connected to the [employer] ON titus and the Connect Clip. The connect clip was paired to patient's phone. They were shown how to utilize the  bluetooth/connect clip to stream audio and phone calls and the titus was demonstrated. The patient was given additional resources regarding connectivity and bluetooth support to reference if needed.    ASSESSMENT: A Right Oticon Ponto 5 SP was fit today. Verification measures were performed. Rocio was given a copy of details on her device. Patient was counseled that exact out of pocket amounts cannot be determined for hearing aid claims being sent to insurance. Any insurance coverage information presented to the patient is an estimate only, and is not a guarantee of payment. Patient has been advised to check with their own insurance.    PLAN: Rocio will return for follow-up as needed. Please call this clinic with questions regarding today s appointment.    Kellen Patricia. CCC-A  Licensed Audiologist   [place] #[id number]

## 2023-09-10 ENCOUNTER — HEALTH MAINTENANCE LETTER (OUTPATIENT)
Age: 68
End: 2023-09-10

## 2024-08-25 ENCOUNTER — HEALTH MAINTENANCE LETTER (OUTPATIENT)
Age: 69
End: 2024-08-25

## 2024-11-03 ENCOUNTER — HEALTH MAINTENANCE LETTER (OUTPATIENT)
Age: 69
End: 2024-11-03

## 2025-04-07 ENCOUNTER — OFFICE VISIT (OUTPATIENT)
Dept: AUDIOLOGY | Facility: CLINIC | Age: 70
End: 2025-04-07
Payer: COMMERCIAL

## 2025-04-07 DIAGNOSIS — H90.A22 SENSORINEURAL HEARING LOSS (SNHL) OF LEFT EAR WITH RESTRICTED HEARING OF RIGHT EAR: ICD-10-CM

## 2025-04-07 DIAGNOSIS — H90.A31 MIXED CONDUCTIVE AND SENSORINEURAL HEARING LOSS OF RIGHT EAR WITH RESTRICTED HEARING OF LEFT EAR: Primary | ICD-10-CM

## 2025-04-07 NOTE — PROGRESS NOTES
AUDIOLOGY REPORT    SUBJECTIVE:Rocio Forbes is a 69 year old female who was seen in the Audiology Clinic at the [facility] on 4/7/2025  for a BAHA check. The patient has been seen previously in this clinic and results indicated a normal sloping to moderate sensorineural hearing loss in the left ear and a severe to profound mixed hearing loss in the right ear. The patient was implanted with a right osseointegrated implant by Dr. Rasheed on 4/11/18. The patient was fit with a replacement [employer] Ponto 5 SP sound processor on 6/22/23. Today, Rocio reports her processor will not turn on.     OBJECTIVE: Abutment was checked and found to be secure. Some crusting was noted at superior portion of abutment, no redness or drainage noted. Patient denies concerns. Notes that she usually just needs to brush it.     Processor was cleaned and checked. Unfortunately device would still not turn on. Informed patient we would have to send in for in-warranty repair. She expressed understanding and agreement. Patient liked settings prior to device not working so same settings will be transferred when device is received and she would like it mailed to her. Offered loaner processor, but patient reports she is ok using just hearing aid for now.     Patient notes she may be transferring ENT and hearing aid care closer to home. She was encouraged to reach out if anything is needed from this clinic.     Approximately 10 minutes was spent in direct analysis assessing, calibrating, programming and confirming processor performance of bone-anchored device      ASSESSMENT: A BAHA check was completed today. Changes to device were completed as outlined above. Processor will be set in for repair.     PLAN: Rocio will return for follow-up as needed. Processor will be mailed with previous settings when it is received. Please call this clinic with any questions regarding today s appointment.    Shirley GEORGES  Berta Grace [facility]-A  Licensed Audiologist   [place] #[id number]

## 2025-06-21 ENCOUNTER — HEALTH MAINTENANCE LETTER (OUTPATIENT)
Age: 70
End: 2025-06-21

## (undated) DEVICE — SUCTION MANIFOLD NEPTUNE 2 SYS 4 PORT 0702-020-000

## (undated) DEVICE — ESU GROUND PAD ADULT W/CORD E7507

## (undated) DEVICE — SOL NACL 0.9% IRRIG 1000ML BOTTLE 2F7124

## (undated) DEVICE — DRSG ALLEVYN 2X2" NON-ADH 66027643

## (undated) DEVICE — PREP POVIDONE IODINE SCRUB 7.5% 120ML

## (undated) DEVICE — ESU ELEC BLADE 2.75" COATED/INSULATED E1455

## (undated) DEVICE — PREP SKIN SCRUB TRAY 4461A

## (undated) DEVICE — BLADE CLIPPER SGL USE 9680

## (undated) DEVICE — PREP POVIDONE IODINE SOLUTION 10% 120ML

## (undated) DEVICE — GLOVE PROTEXIS POWDER FREE SMT 6.5  2D72PT65X

## (undated) DEVICE — PUNCH SKIN 5MM P550

## (undated) DEVICE — NDL 25GA 1.5" 305127

## (undated) DEVICE — DRAPE U SPLIT 74X120" 29440

## (undated) DEVICE — LINEN TOWEL PACK X5 5464

## (undated) DEVICE — PACK EAR CUSTOM ASC

## (undated) RX ORDER — FENTANYL CITRATE 50 UG/ML
INJECTION, SOLUTION INTRAMUSCULAR; INTRAVENOUS
Status: DISPENSED
Start: 2018-04-11

## (undated) RX ORDER — CEFAZOLIN SODIUM 1 G/3ML
INJECTION, POWDER, FOR SOLUTION INTRAMUSCULAR; INTRAVENOUS
Status: DISPENSED
Start: 2018-04-11

## (undated) RX ORDER — LIDOCAINE HYDROCHLORIDE 10 MG/ML
INJECTION, SOLUTION EPIDURAL; INFILTRATION; INTRACAUDAL; PERINEURAL
Status: DISPENSED
Start: 2018-04-11

## (undated) RX ORDER — GABAPENTIN 300 MG/1
CAPSULE ORAL
Status: DISPENSED
Start: 2018-04-11

## (undated) RX ORDER — ONDANSETRON 2 MG/ML
INJECTION INTRAMUSCULAR; INTRAVENOUS
Status: DISPENSED
Start: 2018-04-11

## (undated) RX ORDER — DEXAMETHASONE SODIUM PHOSPHATE 10 MG/ML
INJECTION, SOLUTION INTRAMUSCULAR; INTRAVENOUS
Status: DISPENSED
Start: 2018-04-11

## (undated) RX ORDER — PROPOFOL 10 MG/ML
INJECTION, EMULSION INTRAVENOUS
Status: DISPENSED
Start: 2018-04-11

## (undated) RX ORDER — ACETAMINOPHEN 325 MG/1
TABLET ORAL
Status: DISPENSED
Start: 2018-04-11

## (undated) RX ORDER — LIDOCAINE HYDROCHLORIDE 20 MG/ML
INJECTION, SOLUTION EPIDURAL; INFILTRATION; INTRACAUDAL; PERINEURAL
Status: DISPENSED
Start: 2018-04-11